# Patient Record
Sex: MALE | Race: WHITE | NOT HISPANIC OR LATINO | Employment: FULL TIME | URBAN - METROPOLITAN AREA
[De-identification: names, ages, dates, MRNs, and addresses within clinical notes are randomized per-mention and may not be internally consistent; named-entity substitution may affect disease eponyms.]

---

## 2017-01-09 DIAGNOSIS — I10 HYPERTENSION GOAL BP (BLOOD PRESSURE) < 140/90: Primary | ICD-10-CM

## 2017-01-09 RX ORDER — LOSARTAN POTASSIUM AND HYDROCHLOROTHIAZIDE 25; 100 MG/1; MG/1
1 TABLET ORAL DAILY
Qty: 90 TABLET | Refills: 0 | Status: SHIPPED
Start: 2017-01-09 | End: 2017-05-02

## 2017-01-09 NOTE — TELEPHONE ENCOUNTER
Patient was due in December for BP OV.    No appointment pending at this time.  Routing to provider to advise.    Kady Zarate RN

## 2017-07-26 ENCOUNTER — TELEPHONE (OUTPATIENT)
Dept: FAMILY MEDICINE | Facility: CLINIC | Age: 37
End: 2017-07-26

## 2017-07-26 NOTE — TELEPHONE ENCOUNTER
Patient is calling would like to be fit into providers schedule for physical. Please call to schedule.Thank you.

## 2017-08-02 ENCOUNTER — DOCUMENTATION ONLY (OUTPATIENT)
Dept: LAB | Facility: CLINIC | Age: 37
End: 2017-08-02

## 2017-08-02 DIAGNOSIS — Z00.00 ROUTINE GENERAL MEDICAL EXAMINATION AT A HEALTH CARE FACILITY: ICD-10-CM

## 2017-08-02 DIAGNOSIS — E78.1 HIGH TRIGLYCERIDES: Primary | ICD-10-CM

## 2017-08-02 DIAGNOSIS — Z13.6 CARDIOVASCULAR SCREENING; LDL GOAL LESS THAN 130: ICD-10-CM

## 2017-08-02 DIAGNOSIS — I10 ESSENTIAL HYPERTENSION WITH GOAL BLOOD PRESSURE LESS THAN 140/90: ICD-10-CM

## 2017-08-02 NOTE — PROGRESS NOTES
This patient has a lab only appointment on 8/8/2017 but does not have future orders. Please review, associate diagnosis and sign pending lab orders for the upcoming appointment.  He has an appointment with Dr. Cuellar on 8/15/2017.    Thank you,    [facility]

## 2017-09-12 DIAGNOSIS — I10 HYPERTENSION GOAL BP (BLOOD PRESSURE) < 140/90: ICD-10-CM

## 2017-09-12 NOTE — LETTER
September 14, 2017    Chato Kirkland  [address] [facility] [address]    Dear Chato,       We recently received a refill request for losartan-hydrochlorothiazide (HYZAAR) 100-25 MG per tablet.  We have refilled this for a one time 90 day supply only because you are due for a:    Blood pressure office visit with provider and fasting lab appointment      Please schedule this lab appointment 4-5 days prior to the office visit.     Please call at your earliest convenience so that there will not be a delay with your future refills.          Thank you,   Your [facility] Team/  [phone]

## 2017-09-13 RX ORDER — LOSARTAN POTASSIUM AND HYDROCHLOROTHIAZIDE 25; 100 MG/1; MG/1
1 TABLET ORAL DAILY
Qty: 90 TABLET | Refills: 0 | Status: SHIPPED | OUTPATIENT
Start: 2017-09-13 | End: 2017-12-25

## 2017-09-13 NOTE — TELEPHONE ENCOUNTER
Refill sent for losartan/hctz. Patient needs lab and provider appointment for htn. Please call to schedule appointment    Arin PRETTY, RN, CPN

## 2017-10-30 DIAGNOSIS — I10 HYPERTENSION GOAL BP (BLOOD PRESSURE) < 140/90: ICD-10-CM

## 2017-10-31 RX ORDER — LOSARTAN POTASSIUM AND HYDROCHLOROTHIAZIDE 25; 100 MG/1; MG/1
TABLET ORAL
Qty: 90 TABLET | OUTPATIENT
Start: 2017-10-31

## 2017-10-31 NOTE — TELEPHONE ENCOUNTER
Patient due to office visit for more refills. Last refill sent 9/13/17 and dispensed by mail order pharmacy 10/30/17. Letter was sent to patient 9/14/17. Lizeth Marcos RN

## 2018-01-29 ENCOUNTER — RADIANT APPOINTMENT (OUTPATIENT)
Dept: GENERAL RADIOLOGY | Facility: CLINIC | Age: 38
End: 2018-01-29
Attending: NURSE PRACTITIONER
Payer: COMMERCIAL

## 2018-01-29 ENCOUNTER — OFFICE VISIT (OUTPATIENT)
Dept: FAMILY MEDICINE | Facility: CLINIC | Age: 38
End: 2018-01-29
Payer: COMMERCIAL

## 2018-01-29 VITALS
DIASTOLIC BLOOD PRESSURE: 95 MMHG | OXYGEN SATURATION: 98 % | BODY MASS INDEX: 33.38 KG/M2 | TEMPERATURE: 99.8 F | RESPIRATION RATE: 16 BRPM | SYSTOLIC BLOOD PRESSURE: 151 MMHG | WEIGHT: 253 LBS | HEART RATE: 90 BPM

## 2018-01-29 DIAGNOSIS — R05.9 COUGH: ICD-10-CM

## 2018-01-29 DIAGNOSIS — J40 BRONCHITIS: Primary | ICD-10-CM

## 2018-01-29 PROCEDURE — 99213 OFFICE O/P EST LOW 20 MIN: CPT | Performed by: NURSE PRACTITIONER

## 2018-01-29 PROCEDURE — 71046 X-RAY EXAM CHEST 2 VIEWS: CPT | Mod: FY

## 2018-01-29 RX ORDER — CODEINE PHOSPHATE AND GUAIFENESIN 10; 100 MG/5ML; MG/5ML
1 SOLUTION ORAL EVERY 4 HOURS PRN
Qty: 120 ML | Refills: 0 | Status: SHIPPED | OUTPATIENT
Start: 2018-01-29 | End: 2018-03-01

## 2018-01-29 RX ORDER — ALBUTEROL SULFATE 90 UG/1
2 AEROSOL, METERED RESPIRATORY (INHALATION) EVERY 6 HOURS PRN
Qty: 1 INHALER | Refills: 0 | Status: SHIPPED | OUTPATIENT
Start: 2018-01-29 | End: 2018-03-01

## 2018-01-29 NOTE — MR AVS SNAPSHOT
After Visit Summary   1/29/2018    Chato Kirkland    MRN: [id number]           Patient Information     Date Of Birth          1980        Visit Information        Provider Department      1/29/2018 11:20 AM Yesenia Fajardo APRN CNP [facility]        Today's Diagnoses     Bronchitis    -  1       Follow-ups after your visit        Who to contact     If you have questions or need follow up information about today's clinic visit or your schedule please contact [facility] directly at [phone].  Normal or non-critical lab and imaging results will be communicated to you by [employer], letter or phone within 4 business days after the clinic has received the results. If you do not hear from us within 7 days, please contact the clinic through [employer] or phone. If you have a critical or abnormal lab result, we will notify you by phone as soon as possible.  Submit refill requests through [employer] or call your pharmacy and they will forward the refill request to us. Please allow 3 business days for your refill to be completed.          Additional Information About Your Visit        MyChart Information     [employer] gives you secure access to your electronic health record. If you see a primary care provider, you can also send messages to your care team and make appointments. If you have questions, please call your primary care clinic.  If you do not have a primary care provider, please call [phone] and they will assist you.        Care EveryWhere ID     This is your Care EveryWhere ID. This could be used by other organizations to access your [facility] medical records  [id number]        Your Vitals Were     Pulse Temperature Respirations Pulse Oximetry BMI (Body Mass Index)       90 99.8  F (37.7  C) (Oral) 16 98% 33.38 kg/m2        Blood Pressure from Last 3 Encounters:   01/29/18 (!) 151/95   06/16/16 132/84   01/04/16 132/89    Weight from Last 3 Encounters:   01/29/18 253 lb  (114.8 kg)   06/16/16 243 lb (110.2 kg)   01/04/16 248 lb (112.5 kg)                 Today's Medication Changes          These changes are accurate as of 1/29/18 12:50 PM.  If you have any questions, ask your nurse or doctor.               Start taking these medicines.        Dose/Directions    albuterol 108 (90 BASE) MCG/ACT Inhaler   Commonly known as:  PROAIR HFA/PROVENTIL HFA/VENTOLIN HFA   Used for:  Bronchitis   Started by:  Yesenia Fajardo APRN CNP        Dose:  2 puff   Inhale 2 puffs into the lungs every 6 hours as needed for shortness of breath / dyspnea or wheezing   Quantity:  1 Inhaler   Refills:  0       guaiFENesin-codeine 100-10 MG/5ML Soln solution   Commonly known as:  ROBITUSSIN AC   Used for:  Bronchitis   Started by:  Yesenia Fajardo APRN CNP        Dose:  1 tsp.   Take 5 mLs by mouth every 4 hours as needed for cough   Quantity:  120 mL   Refills:  0            Where to get your medicines      These medications were sent to [facility] [id number] [place] [address] [address] [facility] [employer] [place]  [address] [facility] [address]     Phone:  [phone]     albuterol 108 (90 BASE) MCG/ACT Inhaler         Some of these will need a paper prescription and others can be bought over the counter.  Ask your nurse if you have questions.     Bring a paper prescription for each of these medications     guaiFENesin-codeine 100-10 MG/5ML Soln solution                Primary Care Provider Office Phone # Fax #    Yon Cuellar [phone] [address] [facility] [address]        Equal Access to Services     KEZIA KHALIL AH: Thee Narayan, gabo lumaira, liane kaalmada chava, jess maldonado. So [facility] [phone].    ATENCIÓN: Si habla español, tiene a monique disposición servicios gratuitos de asistencia lingüística. Llame al [phone].    We comply with applicable federal civil rights  laws and [place] laws. We do not discriminate on the basis of race, color, national origin, age, disability, sex, sexual orientation, or gender identity.            Thank you!     Thank you for choosing [facility]  [facility]. Our goal is always to provide you with excellent care. Hearing back from our patients is one way we can continue to improve our services. Please take a few minutes to complete the written survey that you may receive in the mail after your visit with us. Thank you!             Your Updated Medication List - Protect others around you: Learn how to safely use, store and throw away your medicines at www.disposemymeds.org.          This list is accurate as of 1/29/18 12:50 PM.  Always use your most recent med list.                   Brand Name Dispense Instructions for use Diagnosis    albuterol 108 (90 BASE) MCG/ACT Inhaler    PROAIR HFA/PROVENTIL HFA/VENTOLIN HFA    1 Inhaler    Inhale 2 puffs into the lungs every 6 hours as needed for shortness of breath / dyspnea or wheezing    Bronchitis       fish oil-omega-3 fatty acids 1000 MG capsule      Take 1 capsule by mouth daily.        guaiFENesin-codeine 100-10 MG/5ML Soln solution    ROBITUSSIN AC    120 mL    Take 5 mLs by mouth every 4 hours as needed for cough    Bronchitis       losartan-hydrochlorothiazide 100-25 MG per tablet    HYZAAR    30 tablet    Last refill without md appointment/labs    Hypertension goal BP (blood pressure) < 140/90       MULTI VITAMIN MENS PO      Take  by mouth daily.

## 2018-01-29 NOTE — PROGRESS NOTES
SUBJECTIVE:   Chato Kirkland is a 37 year old male who presents to clinic today for the following health issues:      RESPIRATORY SYMPTOMS      Duration: 6 days    Description  nasal congestion, sore throat, cough, wheezing, fever, chills, fatigue/malaise and hoarse voice    Severity: moderate    Accompanying signs and symptoms: None    History (predisposing factors):  none    Precipitating or alleviating factors: None    Therapies tried and outcome:  none    Problem list and histories reviewed & adjusted, as indicated.  Additional history: as documented    Patient Active Problem List   Diagnosis     CARDIOVASCULAR SCREENING; LDL GOAL LESS THAN 130     High triglycerides     Seasonal allergic rhinitis     Essential hypertension with goal blood pressure less than 140/90     History reviewed. No pertinent surgical history.    Social History   Substance Use Topics     Smoking status: Never Smoker     Smokeless tobacco: Never Used     Alcohol use Yes      Comment: rare     Family History   Problem Relation Age of Onset     Hypertension Father            Reviewed and updated as needed this visit by clinical staff  Tobacco  Meds  Med Hx  Surg Hx  Fam Hx  Soc Hx      Reviewed and updated as needed this visit by Provider         ROS:  Constitutional, HEENT, cardiovascular, pulmonary, GI, , musculoskeletal, neuro, skin, endocrine and psych systems are negative, except as otherwise noted.    OBJECTIVE:     BP (!) 151/95  Pulse 90  Temp 99.8  F (37.7  C) (Oral)  Resp 16  Wt 253 lb (114.8 kg)  SpO2 98%  BMI 33.38 kg/m2  Body mass index is 33.38 kg/(m^2).  GENERAL:  alert and no distress  EYES: Eyes grossly normal to inspection, PERRL and conjunctivae and sclerae normal  HENT: ear canals and TM's normal, nose and mouth without ulcers or lesions  NECK: no adenopathy, no asymmetry, masses, or scars and thyroid normal to palpation  RESP: expiratory wheezes throughout  CV: regular rate and rhythm, normal S1 S2, no S3 or  S4, no murmur, click or rub, no peripheral edema and peripheral pulses strong  SKIN: no suspicious lesions or rashes    Diagnostic Test Results:  CXR - unremarkable    ASSESSMENT/PLAN:     1. Bronchitis    - albuterol (PROAIR HFA/PROVENTIL HFA/VENTOLIN HFA) 108 (90 BASE) MCG/ACT Inhaler; Inhale 2 puffs into the lungs every 6 hours as needed for shortness of breath / dyspnea or wheezing  Dispense: 1 Inhaler; Refill: 0  - guaiFENesin-codeine (ROBITUSSIN AC) 100-10 MG/5ML SOLN solution; Take 5 mLs by mouth every 4 hours as needed for cough  Dispense: 120 mL; Refill: 0    See Patient Instructions    GINNY [facility]

## 2018-02-24 DIAGNOSIS — Z13.6 CARDIOVASCULAR SCREENING; LDL GOAL LESS THAN 130: ICD-10-CM

## 2018-02-24 DIAGNOSIS — I10 ESSENTIAL HYPERTENSION WITH GOAL BLOOD PRESSURE LESS THAN 140/90: ICD-10-CM

## 2018-02-24 DIAGNOSIS — E78.1 HIGH TRIGLYCERIDES: ICD-10-CM

## 2018-02-24 DIAGNOSIS — Z00.00 ROUTINE GENERAL MEDICAL EXAMINATION AT A HEALTH CARE FACILITY: ICD-10-CM

## 2018-02-24 LAB
ERYTHROCYTE [DISTWIDTH] IN BLOOD BY AUTOMATED COUNT: 11.9 % (ref 10–15)
HCT VFR BLD AUTO: 40.8 % (ref 40–53)
HGB BLD-MCNC: 14.1 G/DL (ref 13.3–17.7)
MCH RBC QN AUTO: 30 PG (ref 26.5–33)
MCHC RBC AUTO-ENTMCNC: 34.6 G/DL (ref 31.5–36.5)
MCV RBC AUTO: 87 FL (ref 78–100)
PLATELET # BLD AUTO: 281 10E9/L (ref 150–450)
RBC # BLD AUTO: 4.7 10E12/L (ref 4.4–5.9)
WBC # BLD AUTO: 6.3 10E9/L (ref 4–11)

## 2018-02-24 PROCEDURE — 85027 COMPLETE CBC AUTOMATED: CPT | Performed by: FAMILY MEDICINE

## 2018-02-24 PROCEDURE — 80061 LIPID PANEL: CPT | Performed by: FAMILY MEDICINE

## 2018-02-24 PROCEDURE — 80053 COMPREHEN METABOLIC PANEL: CPT | Performed by: FAMILY MEDICINE

## 2018-02-24 PROCEDURE — 36415 COLL VENOUS BLD VENIPUNCTURE: CPT | Performed by: FAMILY MEDICINE

## 2018-02-26 LAB
ALBUMIN SERPL-MCNC: 4.1 G/DL (ref 3.4–5)
ALP SERPL-CCNC: 56 U/L (ref 40–150)
ALT SERPL W P-5'-P-CCNC: 30 U/L (ref 0–70)
ANION GAP SERPL CALCULATED.3IONS-SCNC: 8 MMOL/L (ref 3–14)
AST SERPL W P-5'-P-CCNC: 19 U/L (ref 0–45)
BILIRUB SERPL-MCNC: 0.6 MG/DL (ref 0.2–1.3)
BUN SERPL-MCNC: 13 MG/DL (ref 7–30)
CALCIUM SERPL-MCNC: 9.2 MG/DL (ref 8.5–10.1)
CHLORIDE SERPL-SCNC: 101 MMOL/L (ref 94–109)
CHOLEST SERPL-MCNC: 212 MG/DL
CO2 SERPL-SCNC: 30 MMOL/L (ref 20–32)
CREAT SERPL-MCNC: 0.86 MG/DL (ref 0.66–1.25)
GFR SERPL CREATININE-BSD FRML MDRD: >90 ML/MIN/1.7M2
GLUCOSE SERPL-MCNC: 91 MG/DL (ref 70–99)
HDLC SERPL-MCNC: 41 MG/DL
LDLC SERPL CALC-MCNC: 122 MG/DL
NONHDLC SERPL-MCNC: 171 MG/DL
POTASSIUM SERPL-SCNC: 3.9 MMOL/L (ref 3.4–5.3)
PROT SERPL-MCNC: 7.8 G/DL (ref 6.8–8.8)
SODIUM SERPL-SCNC: 139 MMOL/L (ref 133–144)
TRIGL SERPL-MCNC: 245 MG/DL

## 2018-03-01 ENCOUNTER — OFFICE VISIT (OUTPATIENT)
Dept: FAMILY MEDICINE | Facility: CLINIC | Age: 38
End: 2018-03-01
Payer: COMMERCIAL

## 2018-03-01 VITALS
RESPIRATION RATE: 16 BRPM | WEIGHT: 248 LBS | DIASTOLIC BLOOD PRESSURE: 92 MMHG | TEMPERATURE: 97.2 F | HEART RATE: 65 BPM | BODY MASS INDEX: 32.87 KG/M2 | HEIGHT: 73 IN | OXYGEN SATURATION: 98 % | SYSTOLIC BLOOD PRESSURE: 147 MMHG

## 2018-03-01 DIAGNOSIS — E78.1 HIGH TRIGLYCERIDES: ICD-10-CM

## 2018-03-01 DIAGNOSIS — I10 HYPERTENSION GOAL BP (BLOOD PRESSURE) < 140/90: ICD-10-CM

## 2018-03-01 DIAGNOSIS — Z00.00 ROUTINE GENERAL MEDICAL EXAMINATION AT A HEALTH CARE FACILITY: Primary | ICD-10-CM

## 2018-03-01 PROCEDURE — 99395 PREV VISIT EST AGE 18-39: CPT | Performed by: FAMILY MEDICINE

## 2018-03-01 RX ORDER — ALBUTEROL SULFATE 90 UG/1
2 AEROSOL, METERED RESPIRATORY (INHALATION) EVERY 6 HOURS PRN
COMMUNITY
End: 2019-03-26

## 2018-03-01 RX ORDER — LOSARTAN POTASSIUM AND HYDROCHLOROTHIAZIDE 25; 100 MG/1; MG/1
TABLET ORAL
Qty: 90 TABLET | Refills: 3 | Status: SHIPPED | OUTPATIENT
Start: 2018-03-01 | End: 2018-03-02

## 2018-03-01 RX ORDER — AMLODIPINE BESYLATE 5 MG/1
5 TABLET ORAL DAILY
Qty: 90 TABLET | Refills: 1 | Status: SHIPPED | OUTPATIENT
Start: 2018-03-01 | End: 2018-07-17

## 2018-03-01 ASSESSMENT — PAIN SCALES - GENERAL: PAINLEVEL: NO PAIN (0)

## 2018-03-01 NOTE — PROGRESS NOTES
SUBJECTIVE:   CC: Chato Kirkland is an 37 year old male who presents for preventative health visit.   Taking hyzaar daily. No outside blood pressure cuff but checks at stores and dentist office. Higher in AM and levels out in pm.   No chest pain or shortness of breath.  Exercise regularly. Watching diet now.   Weight loss. Eye exam 1.5 years ago - ok.   No family history early cancers. Skin cancers in family history. No prostate/colon cancers.   Drinks some milk. Not vitaminD. No history ulcers. ALCOHOL - cut down. No regularly nsaids/asa.   Physical   Annual:     Getting at least 3 servings of Calcium per day::  Yes    Bi-annual eye exam::  Yes    Dental care twice a year::  Yes    Sleep apnea or symptoms of sleep apnea::  None    Diet::  Low salt and Low fat/cholesterol    Frequency of exercise::  2-3 days/week    Duration of exercise::  Greater than 60 minutes    Taking medications regularly::  Yes    Medication side effects::  None    Additional concerns today::  No          diet change he is loosing weight / low on carbs and salt -2 cans a pop a week/ low sugar   His blood pressure was high at the dentist/ he needs a letter for the dentist     PROBLEMS TO ADD ON...    Today's PHQ-2 Score:   PHQ-2 ( 1999 [employer]) 3/1/2018   Q1: Little interest or pleasure in doing things 0   Q2: Feeling down, depressed or hopeless 0   PHQ-2 Score 0   Q1: Little interest or pleasure in doing things Not at all   Q2: Feeling down, depressed or hopeless Not at all   PHQ-2 Score 0       Abuse: Current or Past(Physical, Sexual or Emotional)- No  Do you feel safe in your environment - Yes    Social History   Substance Use Topics     Smoking status: Never Smoker     Smokeless tobacco: Never Used     Alcohol use Yes      Comment: rare         Last PSA: No results found for: PSA    Reviewed orders with patient. Reviewed health maintenance and updated orders accordingly - Yes  Labs reviewed in EPIC    Reviewed and updated as needed this visit  "by clinical staff         Reviewed and updated as needed this visit by Provider            Review of Systems  C: NEGATIVE for fever, chills, change in weight  I: NEGATIVE for worrisome rashes, moles or lesions  E: NEGATIVE for vision changes or irritation  ENT: NEGATIVE for ear, mouth and throat problems. Occasionally snoring.  - no marshall  R: NEGATIVE for significant cough or SOB  CV: NEGATIVE for chest pain, palpitations or peripheral edema  GI: NEGATIVE for nausea, abdominal pain, heartburn, or change in bowel habits. No black or bloody stools.    male: negative for dysuria, hematuria, decreased urinary stream, erectile dysfunction, urethral discharge. No hematuria. No std concerns.   M: NEGATIVE for significant arthralgias or myalgia  N: NEGATIVE for weakness, dizziness or paresthesias  E: NEGATIVE for temperature intolerance, skin/hair changes  H: NEGATIVE for bleeding problems.  P: NEGATIVE for changes in mood or affect    OBJECTIVE:   There were no vitals taken for this visit.   BP (!) 147/92  Pulse 65  Temp 97.2  F (36.2  C) (Oral)  Resp 16  Ht 6' 0.5\" (1.842 m)  Wt 248 lb (112.5 kg)  SpO2 98%  BMI 33.17 kg/m2      Physical Exam  GENERAL: healthy, alert and no distress  EYES: Eyes grossly normal to inspection, PERRL and conjunctivae and sclerae normal  HENT: ear canals and TM's normal, nose and mouth without ulcers or lesions  NECK: no adenopathy, no asymmetry, masses, or scars and thyroid normal to palpation  RESP: lungs clear to auscultation - no rales, rhonchi or wheezes  BREAST: normal without masses, tenderness or nipple discharge and no palpable axillary masses or adenopathy  CV: regular rate and rhythm, normal S1 S2, no S3 or S4, no murmur, click or rub, no peripheral edema and peripheral pulses strong  ABDOMEN: soft, nontender, no hepatosplenomegaly, no masses and bowel sounds normal   (male): patient deferred  exam - no concerns about pain/masses/hernia  MS: no gross musculoskeletal " "defects noted, no edema  SKIN: no suspicious lesions or rashes  NEURO: Normal strength and tone, mentation intact and speech normal  BACK: no CVA tenderness, no paralumbar tenderness  PSYCH: mentation appears normal, affect normal/bright    ASSESSMENT/PLAN:   ASSESSMENT / PLAN:  (Z00.00) Routine general medical examination at a health care facility  (primary encounter diagnosis)  Comment: generally healthy and normal exam/labs  Plan:Reviewed self mole/testicle check handout.  VitaminD.     (I10) Hypertension goal BP (blood pressure) < 140/90  Comment: needs help  Plan: losartan-hydrochlorothiazide (HYZAAR) 100-25 MG        per tablet, amLODIPine (NORVASC) 5 MG tablet        Add norvasc. Reveiwed risks and side effects of medication  Continue diet/exercise and weight loss. Chest pain or shortness of breath to er. Recheck in 6 months  Sooner if worse. Note for dentist - ok - no symptoms.     (E78.1) High triglycerides  Comment: encouraged by weight loss  Plan: continue diet/exercise. Recheck one year.         COUNSELING:   Reviewed preventive health counseling, as reflected in patient instructions       Regular exercise       Healthy diet/nutrition       Vision screening       Osteoporosis Prevention/Bone Health         reports that he has never smoked. He has never used smokeless tobacco.    Estimated body mass index is 33.38 kg/(m^2) as calculated from the following:    Height as of 6/16/16: 6' 1\" (1.854 m).    Weight as of 1/29/18: 253 lb (114.8 kg).       Counseling Resources:  ATP IV Guidelines  Pooled Cohorts Equation Calculator  FRAX Risk Assessment  ICSI Preventive Guidelines  Dietary Guidelines for Americans, 2010  USDA's MyPlate  ASA Prophylaxis  Lung CA Screening    Yon Cuellar MD  [facility] [place]  Answers for HPI/ROS submitted by the patient on 3/1/2018   PHQ-2 Score: 0    "

## 2018-03-01 NOTE — LETTER
[facility]  [address] [facility] [address]  Phone: [phone]    March 1, 2018        Chato Kirkland  [address] [facility] [address]          To whom it may concern:    RE: Chato Kirkland    Patient was seen and treated today at our clinic today. Patient with history of high blood pressure and improving with new medication. No symptoms. Ok for dental appointment.     Please contact me for questions or concerns.      Sincerely,        Yon Cuellar MD

## 2018-03-01 NOTE — MR AVS SNAPSHOT
"              After Visit Summary   3/1/2018    Chato Kirkland    MRN: [id number]           Patient Information     Date Of Birth          1980        Visit Information        Provider Department      3/1/2018 9:20 AM Yon Cuellar MD [facility]        Today's Diagnoses     Routine general medical examination at a health care facility    -  1    Hypertension goal BP (blood pressure) < 140/90        High triglycerides           Follow-ups after your visit        Who to contact     If you have questions or need follow up information about today's clinic visit or your schedule please contact [employer] [employer] directly at [phone].  Normal or non-critical lab and imaging results will be communicated to you by MyChart, letter or phone within 4 business days after the clinic has received the results. If you do not hear from us within 7 days, please contact the clinic through [employer] or phone. If you have a critical or abnormal lab result, we will notify you by phone as soon as possible.  Submit refill requests through [employer] or call your pharmacy and they will forward the refill request to us. Please allow 3 business days for your refill to be completed.          Additional Information About Your Visit        MyChart Information     [employer] gives you secure access to your electronic health record. If you see a primary care provider, you can also send messages to your care team and make appointments. If you have questions, please call your primary care clinic.  If you do not have a primary care provider, please call [phone] and they will assist you.        Care EveryWhere ID     This is your Care EveryWhere ID. This could be used by other organizations to access your [facility] medical records  [id number]        Your Vitals Were     Pulse Temperature Respirations Height Pulse Oximetry BMI (Body Mass Index)    65 97.2  F (36.2  C) (Oral) 16 6' 0.5\" (1.842 m) 98% 33.17 kg/m2       Blood " Pressure from Last 3 Encounters:   03/01/18 (!) 147/92   01/29/18 (!) 151/95   06/16/16 132/84    Weight from Last 3 Encounters:   03/01/18 248 lb (112.5 kg)   01/29/18 253 lb (114.8 kg)   06/16/16 243 lb (110.2 kg)              Today, you had the following     No orders found for display         Today's Medication Changes          These changes are accurate as of 3/1/18  9:49 AM.  If you have any questions, ask your nurse or doctor.               Start taking these medicines.        Dose/Directions    amLODIPine 5 MG tablet   Commonly known as:  NORVASC   Used for:  Hypertension goal BP (blood pressure) < 140/90   Started by:  Yon Cuellar MD        Dose:  5 mg   Take 1 tablet (5 mg) by mouth daily New for blood pressure take in PM   Quantity:  90 tablet   Refills:  1         These medicines have changed or have updated prescriptions.        Dose/Directions    losartan-hydrochlorothiazide 100-25 MG per tablet   Commonly known as:  HYZAAR   This may have changed:  additional instructions   Used for:  Hypertension goal BP (blood pressure) < 140/90   Changed by:  Yon Cuellar MD        For blood pressure in AM   Quantity:  90 tablet   Refills:  3            Where to get your medicines      These medications were sent to [facility] - [address], [facility] [address]     Phone:  [phone]     amLODIPine 5 MG tablet    losartan-hydrochlorothiazide 100-25 MG per tablet                Primary Care Provider Office Phone # Fax #    Yon Cuellar [phone] [address] [facility] [address]        Equal Access to Services     [facility] : Hadii fletcher ku hadasho Soomaali, waaxda luqadaha, qaybta kaalmada adeegyajuliette, jess maldonado. So [facility] [phone].    ATENCIÓN: Si habla español, tiene a monique disposición servicios gratuitos de asistencia lingüística. Llame al [phone].    We  comply with applicable federal civil rights laws and [place] laws. We do not discriminate on the basis of race, color, national origin, age, disability, sex, sexual orientation, or gender identity.            Thank you!     Thank you for choosing [facility]  for your care. Our goal is always to provide you with excellent care. Hearing back from our patients is one way we can continue to improve our services. Please take a few minutes to complete the written survey that you may receive in the mail after your visit with us. Thank you!             Your Updated Medication List - Protect others around you: Learn how to safely use, store and throw away your medicines at www.disposemymeds.org.          This list is accurate as of 3/1/18  9:49 AM.  Always use your most recent med list.                   Brand Name Dispense Instructions for use Diagnosis    albuterol 108 (90 BASE) MCG/ACT Inhaler    PROAIR HFA/PROVENTIL HFA/VENTOLIN HFA     Inhale 2 puffs into the lungs every 6 hours as needed for shortness of breath / dyspnea or wheezing        amLODIPine 5 MG tablet    NORVASC    90 tablet    Take 1 tablet (5 mg) by mouth daily New for blood pressure take in PM    Hypertension goal BP (blood pressure) < 140/90       fish oil-omega-3 fatty acids 1000 MG capsule      Take 1 capsule by mouth daily.        losartan-hydrochlorothiazide 100-25 MG per tablet    HYZAAR    90 tablet    For blood pressure in AM    Hypertension goal BP (blood pressure) < 140/90       MULTI VITAMIN MENS PO      Take  by mouth daily.

## 2018-03-01 NOTE — NURSING NOTE
"Chief Complaint   Patient presents with     Physical       Initial BP (!) 139/110  Pulse 65  Temp 97.2  F (36.2  C) (Oral)  Resp 16  Ht 6' 0.5\" (1.842 m)  Wt 248 lb (112.5 kg)  SpO2 98%  BMI 33.17 kg/m2 Estimated body mass index is 33.17 kg/(m^2) as calculated from the following:    Height as of this encounter: 6' 0.5\" (1.842 m).    Weight as of this encounter: 248 lb (112.5 kg).  Medication Reconciliation: complete   Sonia Clark CMA    "

## 2018-03-02 ENCOUNTER — TELEPHONE (OUTPATIENT)
Dept: FAMILY MEDICINE | Facility: CLINIC | Age: 38
End: 2018-03-02

## 2018-03-02 DIAGNOSIS — I10 HYPERTENSION GOAL BP (BLOOD PRESSURE) < 140/90: ICD-10-CM

## 2018-03-02 RX ORDER — LOSARTAN POTASSIUM AND HYDROCHLOROTHIAZIDE 25; 100 MG/1; MG/1
1 TABLET ORAL EVERY MORNING
Qty: 90 TABLET | Refills: 3 | Status: SHIPPED | OUTPATIENT
Start: 2018-03-02 | End: 2019-03-26

## 2018-03-05 ENCOUNTER — TELEPHONE (OUTPATIENT)
Dept: FAMILY MEDICINE | Facility: CLINIC | Age: 38
End: 2018-03-05

## 2018-03-05 NOTE — TELEPHONE ENCOUNTER
Please clarify directions for lorsartan/HCTZ 100-25 tabs  [facility]   Fax [phone]  Call [phone]

## 2018-03-26 ENCOUNTER — TELEPHONE (OUTPATIENT)
Dept: FAMILY MEDICINE | Facility: CLINIC | Age: 38
End: 2018-03-26

## 2018-04-09 ENCOUNTER — TELEPHONE (OUTPATIENT)
Dept: FAMILY MEDICINE | Facility: CLINIC | Age: 38
End: 2018-04-09

## 2018-04-09 NOTE — TELEPHONE ENCOUNTER
Patient calling would like a call back from care team re recent physical and labs that were ordered. Please call to discuss.

## 2018-06-28 ENCOUNTER — TELEPHONE (OUTPATIENT)
Dept: FAMILY MEDICINE | Facility: CLINIC | Age: 38
End: 2018-06-28

## 2018-06-28 NOTE — TELEPHONE ENCOUNTER
Panel Management Review      Patient has the following on his problem list:     Hypertension   Last three blood pressure readings:  BP Readings from Last 3 Encounters:   03/01/18 (!) 147/92   01/29/18 (!) 151/95   06/16/16 132/84     Blood pressure: FAILED    HTN Guidelines:  Age 18-59 BP range:  Less than 140/90  Age 60-85 with Diabetes:  Less than 140/90  Age 60-85 without Diabetes:  less than 150/90      Composite cancer screening  Chart review shows that this patient is due/due soon for the following None  Summary:    Patient is due/failing the following:   BP CHECK    Action needed:   None follow up in 6 months / September 2018    Type of outreach:    0    Questions for provider review:    None                                                                                                                                    Sonia Calrk CMA     Chart routed to 0 .

## 2018-07-17 DIAGNOSIS — I10 HYPERTENSION GOAL BP (BLOOD PRESSURE) < 140/90: ICD-10-CM

## 2018-07-17 RX ORDER — AMLODIPINE BESYLATE 5 MG/1
TABLET ORAL
Qty: 90 TABLET | Refills: 0 | Status: SHIPPED | OUTPATIENT
Start: 2018-07-17 | End: 2019-03-26

## 2018-07-17 NOTE — TELEPHONE ENCOUNTER
Per OV note 3/1/18 Recheck in 6 months makes him due 9/1/18.  # 90 amLODIPine (NORVASC) 5 MG tablet

## 2018-10-25 ENCOUNTER — TELEPHONE (OUTPATIENT)
Dept: FAMILY MEDICINE | Facility: CLINIC | Age: 38
End: 2018-10-25

## 2018-10-25 NOTE — TELEPHONE ENCOUNTER
Panel Management Review      Patient has the following on his problem list:     Hypertension   Last three blood pressure readings:  BP Readings from Last 3 Encounters:   03/01/18 (!) 147/92   01/29/18 (!) 151/95   06/16/16 132/84     Blood pressure: FAILED    HTN Guidelines:  Age 18-59 BP range:  Less than 140/90  Age 60-85 with Diabetes:  Less than 140/90  Age 60-85 without Diabetes:  less than 150/90      Composite cancer screening  Chart review shows that this patient is due/due soon for the following None  Summary:    Patient is due/failing the following:   BP CHECK    Action needed:   Patient needs office visit for Blood pressure check .    Type of outreach:    Sent W4 message.    Questions for provider review:    None                                                                                                                                    Sonia Clark CMA     Chart routed to 0 .

## 2019-01-28 DIAGNOSIS — I10 HYPERTENSION GOAL BP (BLOOD PRESSURE) < 140/90: ICD-10-CM

## 2019-01-28 RX ORDER — LOSARTAN POTASSIUM AND HYDROCHLOROTHIAZIDE 25; 100 MG/1; MG/1
TABLET ORAL
Qty: 90 TABLET | Refills: 3 | OUTPATIENT
Start: 2019-01-28

## 2019-02-27 ENCOUNTER — TELEPHONE (OUTPATIENT)
Dept: FAMILY MEDICINE | Facility: CLINIC | Age: 39
End: 2019-02-27

## 2019-02-27 DIAGNOSIS — I10 ESSENTIAL HYPERTENSION WITH GOAL BLOOD PRESSURE LESS THAN 140/90: Primary | ICD-10-CM

## 2019-02-27 NOTE — TELEPHONE ENCOUNTER
His medication requires a renal panel done at least once yearly per clinic policy. I don't think it's too expensive and I won't do cholesterol/blood count this year. Yon Cuellar MD

## 2019-02-27 NOTE — TELEPHONE ENCOUNTER
I called Chato and set up a Blood Pressure Check Appt. On 03-. He has a concern about getting his labs draw because last time they were not covered by his Insurance. Does he need to get labs done? It was the way it was coded he thinks. He would rather not get labs done unless we figure it out somehow.    Sonia Clark, [facility]  I talk to Annette Vargas and she is going to help with finding out about insurance coverage for labs.

## 2019-02-28 ENCOUNTER — TELEPHONE (OUTPATIENT)
Dept: FAMILY MEDICINE | Facility: CLINIC | Age: 39
End: 2019-02-28

## 2019-02-28 ENCOUNTER — PATIENT OUTREACH (OUTPATIENT)
Dept: CARE COORDINATION | Facility: CLINIC | Age: 39
End: 2019-02-28

## 2019-02-28 DIAGNOSIS — E78.1 HIGH TRIGLYCERIDES: Primary | ICD-10-CM

## 2019-02-28 NOTE — PROGRESS NOTES
"Clinic Care Coordination Contact 2/28/19  OUTREACH    Referral Information:            Chief Complaint   Patient presents with     Clinic Care Coordination - Initial     SW        Universal Utilization: NA     Utilization    Last refreshed: 2/28/2019  9:17 AM:  Hospital Admissions 0           Last refreshed: 2/28/2019  9:17 AM:  ED Visits 0           Last refreshed: 2/28/2019  9:17 AM:  No Show Count (past year) 0              Current as of: 2/28/2019  9:17 AM              Clinical Concerns:  Current Medical Concerns:    Patient Active Problem List   Diagnosis     CARDIOVASCULAR SCREENING; LDL GOAL LESS THAN 130     High triglycerides     Seasonal allergic rhinitis     Essential hypertension with goal blood pressure less than 140/90       Current Behavioral Concerns: See above    Education Provided to patient: NA      Health Maintenance Reviewed:    Clinical Pathway: None    Medication Management:  NA     Functional Status:       Living Situation:       Diet/Exercise/Sleep:       Transportation:           Psychosocial:        Financial/Insurance:   Pt has insurance and was wondering why he received a bill from his preventative visit. Discussion with him about preventative visits as typically a \"hands off\" exam where no labs or BP are taken etc. Pt states, that is all he wants than at his visit.     I explained further that his PCP wants him to have lab work to monitor his renal function as he is on a medication that needs that performed annually. I provided him with the [facility] Estimate Line [phone] and also encouraged him to shop around to different labs for the best out of pocket price.     Pt liked the sounds of this and would like to know the name of the lab draw so he can check pricing. I will route a message to his PCP so he can answer that question for the pt.     I also encouraged the pt to call his insurance company to inquire what was covered in the preventative visit so he could better understand what " that apt can offer.        Resources and Interventions:  Current Resources:      Goals:         Patient/Caregiver understanding: yes       Future Appointments              In 3 weeks Yon Cuellar MD [facility]          Plan:   1.) SW to route message to pt's PCP about the name of the lab draw.  2.) Pt to call the [facility] Estimate line [phone] as well as other labs to inquire the price of the lab he needs to have.  3.) Pt to call his insurance company and inquire what they cover with the preventative visit.     SW will no longer follow at this time, but please re-refer should needs arise.     Annette Vargas, \[facility]\""  Care Coordinator Social Work    [facility] [place] and [place]  [phone]  2/28/2019 3:54 PM

## 2019-02-28 NOTE — PROGRESS NOTES
Pt advised of name of lab test and ICD 9 code attached to it.  Advised usually screenings and well checks are covered completely by insurance but when there is a dx like hypertension then that is covered differently.  This test is for the hypertension medication he is on the provider needs to monitor how it is affecting his electrolytes and kidney function.  Advised more test were done last year for monitoring due to not having been done in a few years, since they were normal they may not be needed for a few years, he can discuss with provider. Pt verbalized understanding.  Sonia Cardenas BSN, RN

## 2019-02-28 NOTE — TELEPHONE ENCOUNTER
Panel Management Review      Patient has the following on his problem list:     Hypertension   Last three blood pressure readings:  BP Readings from Last 3 Encounters:   03/01/18 (!) 147/92   01/29/18 (!) 151/95   06/16/16 132/84     Blood pressure: FAILED    HTN Guidelines:  Age 18-59 BP range:  Less than 140/90  Age 60-85 with Diabetes:  Less than 140/90  Age 60-85 without Diabetes:  less than 150/90      Composite cancer screening  Chart review shows that this patient is due/due soon for the following None  Summary:    Patient is due/failing the following:   BP CHECK    Action needed:   Scheduled an appt    Type of outreach:    Phone, spoke to patient.  called Chato and zayda appt    Questions for provider review:    None                                                                                                                                    Sonia Clark CMA     Chart routed to 0 .

## 2019-03-18 ENCOUNTER — DOCUMENTATION ONLY (OUTPATIENT)
Dept: LAB | Facility: CLINIC | Age: 39
End: 2019-03-18

## 2019-03-18 DIAGNOSIS — Z13.6 CARDIOVASCULAR SCREENING; LDL GOAL LESS THAN 130: Primary | ICD-10-CM

## 2019-03-18 DIAGNOSIS — E78.1 HIGH TRIGLYCERIDES: ICD-10-CM

## 2019-03-18 DIAGNOSIS — I10 ESSENTIAL HYPERTENSION WITH GOAL BLOOD PRESSURE LESS THAN 140/90: ICD-10-CM

## 2019-03-20 DIAGNOSIS — I10 ESSENTIAL HYPERTENSION WITH GOAL BLOOD PRESSURE LESS THAN 140/90: ICD-10-CM

## 2019-03-20 DIAGNOSIS — E78.1 HIGH TRIGLYCERIDES: ICD-10-CM

## 2019-03-20 DIAGNOSIS — Z13.6 CARDIOVASCULAR SCREENING; LDL GOAL LESS THAN 130: ICD-10-CM

## 2019-03-20 LAB
ALBUMIN SERPL-MCNC: 4.6 G/DL (ref 3.4–5)
ANION GAP SERPL CALCULATED.3IONS-SCNC: 11 MMOL/L (ref 3–14)
BUN SERPL-MCNC: 16 MG/DL (ref 7–30)
CALCIUM SERPL-MCNC: 10.4 MG/DL (ref 8.5–10.1)
CHLORIDE SERPL-SCNC: 99 MMOL/L (ref 94–109)
CO2 SERPL-SCNC: 26 MMOL/L (ref 20–32)
CREAT SERPL-MCNC: 0.94 MG/DL (ref 0.66–1.25)
GFR SERPL CREATININE-BSD FRML MDRD: >90 ML/MIN/{1.73_M2}
GLUCOSE SERPL-MCNC: 88 MG/DL (ref 70–99)
PHOSPHATE SERPL-MCNC: 3.8 MG/DL (ref 2.5–4.5)
POTASSIUM SERPL-SCNC: 3.7 MMOL/L (ref 3.4–5.3)
SODIUM SERPL-SCNC: 136 MMOL/L (ref 133–144)

## 2019-03-20 PROCEDURE — 80069 RENAL FUNCTION PANEL: CPT | Performed by: FAMILY MEDICINE

## 2019-03-20 PROCEDURE — 36415 COLL VENOUS BLD VENIPUNCTURE: CPT | Performed by: FAMILY MEDICINE

## 2019-03-26 ENCOUNTER — OFFICE VISIT (OUTPATIENT)
Dept: FAMILY MEDICINE | Facility: CLINIC | Age: 39
End: 2019-03-26
Payer: COMMERCIAL

## 2019-03-26 VITALS
HEART RATE: 71 BPM | HEIGHT: 73 IN | DIASTOLIC BLOOD PRESSURE: 88 MMHG | SYSTOLIC BLOOD PRESSURE: 131 MMHG | WEIGHT: 248 LBS | BODY MASS INDEX: 32.87 KG/M2 | RESPIRATION RATE: 16 BRPM | OXYGEN SATURATION: 97 % | TEMPERATURE: 98.1 F

## 2019-03-26 DIAGNOSIS — I10 HYPERTENSION GOAL BP (BLOOD PRESSURE) < 140/90: ICD-10-CM

## 2019-03-26 DIAGNOSIS — I10 ESSENTIAL HYPERTENSION WITH GOAL BLOOD PRESSURE LESS THAN 140/90: ICD-10-CM

## 2019-03-26 DIAGNOSIS — Z23 NEED FOR VACCINATION: Primary | ICD-10-CM

## 2019-03-26 PROCEDURE — 90715 TDAP VACCINE 7 YRS/> IM: CPT | Performed by: FAMILY MEDICINE

## 2019-03-26 PROCEDURE — 99213 OFFICE O/P EST LOW 20 MIN: CPT | Mod: 25 | Performed by: FAMILY MEDICINE

## 2019-03-26 PROCEDURE — 90471 IMMUNIZATION ADMIN: CPT | Performed by: FAMILY MEDICINE

## 2019-03-26 RX ORDER — LOSARTAN POTASSIUM AND HYDROCHLOROTHIAZIDE 25; 100 MG/1; MG/1
1 TABLET ORAL EVERY MORNING
Qty: 90 TABLET | Refills: 3 | Status: SHIPPED | OUTPATIENT
Start: 2019-03-26 | End: 2020-07-21

## 2019-03-26 RX ORDER — AMLODIPINE BESYLATE 5 MG/1
TABLET ORAL
Qty: 90 TABLET | Refills: 3 | Status: SHIPPED | OUTPATIENT
Start: 2019-03-26 | End: 2020-07-21

## 2019-03-26 ASSESSMENT — MIFFLIN-ST. JEOR: SCORE: 2090.86

## 2019-03-26 NOTE — NURSING NOTE
"Chief Complaint   Patient presents with     Hypertension       Initial BP (!) 132/100   Pulse 71   Temp 98.1  F (36.7  C) (Oral)   Resp 16   Ht 1.842 m (6' 0.5\")   Wt 112.5 kg (248 lb)   SpO2 97%   BMI 33.17 kg/m   Estimated body mass index is 33.17 kg/m  as calculated from the following:    Height as of this encounter: 1.842 m (6' 0.5\").    Weight as of this encounter: 112.5 kg (248 lb).    Sonia Clark CMA  Screening Questionnaire for Adult Immunization    Are you sick today?   No   Do you have allergies to medications, food, a vaccine component or latex?   No   Have you ever had a serious reaction after receiving a vaccination?   No   Do you have a long-term health problem with heart disease, lung disease, asthma, kidney disease, metabolic disease (e.g. diabetes), anemia, or other blood disorder?   No   Do you have cancer, leukemia, HIV/AIDS, or any other immune system problem?   No   In the past 3 months, have you taken medications that affect  your immune system, such as prednisone, other steroids, or anticancer drugs; drugs for the treatment of rheumatoid arthritis, Crohn s disease, or psoriasis; or have you had radiation treatments?   No   Have you had a seizure, or a brain or other nervous system problem?   No   During the past year, have you received a transfusion of blood or blood     products, or been given immune (gamma) globulin or antiviral drug?   No   For women: Are you pregnant or is there a chance you could become        pregnant during the next month?   No   Have you received any vaccinations in the past 4 weeks?   No     Immunization questionnaire answers were all negative.        Per orders of Dr. Cuellar, injection of tdap  given by Sonia Clark. Patient instructed to remain in clinic for 15 minutes afterwards, and to report any adverse reaction to me immediately.       Screening performed by Sonia Clark on 3/26/2019 at 1:36 PM.'  "

## 2019-03-26 NOTE — PROGRESS NOTES
"SUBJECTIVE:  Chato Kirkland, a 38 year old male scheduled an appointment to discuss the following issues:  Need for vaccination  Follow-up htn, high triglycerides and obesity. Outside blood pressure reading.   No chest pain or shortness of breath. Emotionally doing. Exercise - started more recently.   No urine changes or hematuria. No nausea, vomiting or diarrhea or black or bloody stools. Emotionally doing ok.   Home ok. No body aches/fatigue. Some milk. No supplements.   Medical, social, surgical, and family histories reviewed.    ROS:  All other ROS negative.     OBJECTIVE:  /88   Pulse 71   Temp 98.1  F (36.7  C) (Oral)   Resp 16   Ht 1.842 m (6' 0.5\")   Wt 112.5 kg (248 lb)   SpO2 97%   BMI 33.17 kg/m    EXAM:  GENERAL APPEARANCE: healthy, alert and no distress  EYES: EOMI,  PERRL  HENT: ear canals and TM's normal and nose and mouth without ulcers or lesions  NECK: no adenopathy, no asymmetry, masses, or scars and thyroid normal to palpation  RESP: lungs clear to auscultation - no rales, rhonchi or wheezes  CV: regular rates and rhythm, normal S1 S2, no S3 or S4 and no murmur, click or rub -  ABDOMEN:  soft, nontender, no HSM or masses and bowel sounds normal  MS: extremities normal- no gross deformities noted, no evidence of inflammation in joints, FROM in all extremities.  NEURO: Normal strength and tone, sensory exam grossly normal, mentation intact and speech normal  PSYCH: mentation appears normal and affect normal/bright    ASSESSMENT / PLAN:  (Z23) Need for vaccination  (primary encounter diagnosis)  Plan: TDAP VACCINE (ADACEL) [52509.002], 1st          Administration  [84150]            (I10) Essential hypertension with goal blood pressure less than 140/90  Plan: order for DME            (I10) Hypertension goal BP (blood pressure) < 140/90  Comment: a little high  Plan: losartan-hydrochlorothiazide (HYZAAR) 100-25 MG        tablet, amLODIPine (NORVASC) 5 MG tablet        Diet/exercise and 10 " lbs weight loss. Chest pain or shortness of breath to er. Prescription for home blood pressure cuff. Recheck in 6 months sooner if worse.   Discussed high calcium and possible lab error or from recent diet. Recheck in 6 months  Sooner if symptoms. Expected course and warning signs reviewed. Call/email with questions/concerns     Yon Cuellar MD

## 2019-05-06 ENCOUNTER — TELEPHONE (OUTPATIENT)
Dept: FAMILY MEDICINE | Facility: CLINIC | Age: 39
End: 2019-05-06

## 2019-05-06 NOTE — TELEPHONE ENCOUNTER
Patient received a message that the FDA recalled his Losartan, please call to discuss an alternative.

## 2019-05-06 NOTE — TELEPHONE ENCOUNTER
Confirmed pt did get letter from pharmacy that his medication was in the recall.  Advised only some lots were in recall and pharmacy's are usually sending alternate  prescription for replacement.  There is a shortage of the combo pill now so pharmacy's are asking providers to send two prescription's one for losartan and one for hydrochlorothiazide as replacement.  Advised if pt is not comfortable staying on losartan provider can change med.  Pt will contact his pharmacy and then call me back with what they say and if we need to do anything.  [phone].  Sonia Cardenas BSN, RN

## 2019-10-10 ENCOUNTER — TELEPHONE (OUTPATIENT)
Dept: FAMILY MEDICINE | Facility: CLINIC | Age: 39
End: 2019-10-10

## 2019-10-10 DIAGNOSIS — I10 ESSENTIAL HYPERTENSION WITH GOAL BLOOD PRESSURE LESS THAN 140/90: Primary | ICD-10-CM

## 2019-10-10 RX ORDER — LOSARTAN POTASSIUM 100 MG/1
100 TABLET ORAL DAILY
Qty: 90 TABLET | Refills: 1 | Status: SHIPPED | OUTPATIENT
Start: 2019-10-10 | End: 2020-03-11

## 2019-10-10 RX ORDER — HYDROCHLOROTHIAZIDE 25 MG/1
25 TABLET ORAL DAILY
Qty: 90 TABLET | Refills: 1 | Status: SHIPPED | OUTPATIENT
Start: 2019-10-10 | End: 2020-02-24

## 2019-10-10 NOTE — TELEPHONE ENCOUNTER
Separate prescription for losartan 100 mg and hydrochlorothiazide 25 mg sent to pharmacy.  Pt notified and verbalizes understanding of plan.  To provider to cosign.  Sonia MERCEDESN, RN

## 2019-10-10 NOTE — TELEPHONE ENCOUNTER
Message: Losartan/Hctz 100-25 is currently out of stock. Please supp alt or if you wish to transfer the presc out to a Local Pharmacy, please have the Pharmacy contact us. Please advise your pt copay may vary at a Local Pharmacy.    Please be advise the following are NOT avail and currently out of stock by the [facility]: Olmesartan/Hctz 20/12.5mg, 40.12.5mg, 40/25mg. Telmisartan tab 20mg, 40mg, 80mg; Telm/Hctz All Strengths; Irbesartan/Hctz 75mg, 150mg, 300mg, 150/12.5mg, 300/12.5mg; Losartan/Hctz 50/12.5mg, 100/12.5mg, 100-25; Losartan tab 100mg, 50mg.

## 2020-02-22 DIAGNOSIS — I10 ESSENTIAL HYPERTENSION WITH GOAL BLOOD PRESSURE LESS THAN 140/90: ICD-10-CM

## 2020-02-24 RX ORDER — HYDROCHLOROTHIAZIDE 25 MG/1
TABLET ORAL
Qty: 90 TABLET | Refills: 1 | Status: SHIPPED | OUTPATIENT
Start: 2020-02-24 | End: 2020-07-21

## 2020-02-25 ENCOUNTER — OFFICE VISIT (OUTPATIENT)
Dept: FAMILY MEDICINE | Facility: CLINIC | Age: 40
End: 2020-02-25
Payer: COMMERCIAL

## 2020-02-25 VITALS
DIASTOLIC BLOOD PRESSURE: 88 MMHG | SYSTOLIC BLOOD PRESSURE: 133 MMHG | HEART RATE: 92 BPM | WEIGHT: 250.4 LBS | TEMPERATURE: 98.5 F | HEIGHT: 72 IN | RESPIRATION RATE: 16 BRPM | BODY MASS INDEX: 33.92 KG/M2 | OXYGEN SATURATION: 98 %

## 2020-02-25 DIAGNOSIS — I10 ESSENTIAL HYPERTENSION WITH GOAL BLOOD PRESSURE LESS THAN 140/90: ICD-10-CM

## 2020-02-25 DIAGNOSIS — R05.9 COUGH: Primary | ICD-10-CM

## 2020-02-25 PROCEDURE — 99214 OFFICE O/P EST MOD 30 MIN: CPT | Performed by: INTERNAL MEDICINE

## 2020-02-25 RX ORDER — BENZONATATE 100 MG/1
100 CAPSULE ORAL 3 TIMES DAILY PRN
Qty: 20 CAPSULE | Refills: 0 | Status: SHIPPED | OUTPATIENT
Start: 2020-02-25 | End: 2020-07-21

## 2020-02-25 RX ORDER — ALBUTEROL SULFATE 90 UG/1
2 AEROSOL, METERED RESPIRATORY (INHALATION) EVERY 4 HOURS PRN
Qty: 1 INHALER | Refills: 0 | Status: SHIPPED | OUTPATIENT
Start: 2020-02-25 | End: 2023-05-31

## 2020-02-25 ASSESSMENT — MIFFLIN-ST. JEOR: SCORE: 2095.81

## 2020-02-25 NOTE — PROGRESS NOTES
SUBJECTIVE:  Chato Kirkland is an 39 year old male who presents for cough.  Started about a week ago.  Initially though was some seasonal allergies.  After a few days of congestion, cough seemed to worsen.  Some intermittent ear pain.  Was tired.  No fever.  Thought he was improving but then got some chills and had temp up to 101 or 12 four days ago.  Had fever for a couple days but now gone.  Is still coughing.  Had some decreased appetite and fatigue.  Seems to be feeling better other than some ongoing cough.  Cough keeps him up at night.  Cough is not productive and seems to be a lot from nasal drainage.  Some sore throat.  No recent intl travel.  No known exposures but dtr started similar sxs after he did.  Has taken nyquil and ibuprofen and cough drops which help some.    PMH:   has a past medical history of Hypertension.  Patient Active Problem List   Diagnosis     CARDIOVASCULAR SCREENING; LDL GOAL LESS THAN 130     High triglycerides     Seasonal allergic rhinitis     Essential hypertension with goal blood pressure less than 140/90     Social History     Socioeconomic History     Marital status:      Spouse name: None     Number of children: 2     Years of education: None     Highest education level: None   Occupational History     Employer: [employer]   Social Needs     Financial resource strain: None     Food insecurity:     Worry: None     Inability: None     Transportation needs:     Medical: None     Non-medical: None   Tobacco Use     Smoking status: Never Smoker     Smokeless tobacco: Never Used   Substance and Sexual Activity     Alcohol use: Yes     Comment: rare/ once a month      Drug use: No     Sexual activity: Yes     Partners: Female   Lifestyle     Physical activity:     Days per week: None     Minutes per session: None     Stress: None   Relationships     Social connections:     Talks on phone: None     Gets together: None     Attends [employer] service: None     Active  "member of club or organization: None     Attends meetings of clubs or organizations: None     Relationship status: None     Intimate partner violence:     Fear of current or ex partner: None     Emotionally abused: None     Physically abused: None     Forced sexual activity: None   Other Topics Concern     Parent/sibling w/ CABG, MI or angioplasty before 65F 55M? Not Asked   Social History Narrative     None     Family History   Problem Relation Age of Onset     Hypertension Father        ALLERGIES:  Patient has no known allergies.    Current Outpatient Medications   Medication     amLODIPine (NORVASC) 5 MG tablet     fish oil-omega-3 fatty acids (FISH OIL) 1000 MG capsule     hydrochlorothiazide (HYDRODIURIL) 25 MG tablet     losartan (COZAAR) 100 MG tablet     Multiple Vitamin (MULTI VITAMIN MENS PO)     order for DME     losartan-hydrochlorothiazide (HYZAAR) 100-25 MG tablet     No current facility-administered medications for this visit.          ROS:  ROS is done and is negative for general/constitutional, eye, ENT, Respiratory, cardiovascular, GI, , Skin, musculoskeletal except as noted elsewhere.  All other review of systems negative except as noted elsewhere.      OBJECTIVE:  BP (!) 131/90   Pulse 92   Temp 98.5  F (36.9  C) (Oral)   Resp 16   Ht 1.84 m (6' 0.44\")   Wt 113.6 kg (250 lb 6.4 oz)   SpO2 98%   BMI 33.55 kg/m    GENERAL APPEARANCE: Alert, in no acute distress  EYES: normal  EARS: External ears normal. Canals clear. TM's normal.  NOSE:mildly inflamed mucosa  OROPHARYNX:normal  NECK:No adenopathy,masses or thyromegaly  RESP: normal and clear to auscultation  CV:regular rate and rhythm and no murmurs, clicks, or gallops  ABDOMEN: Abdomen soft, non-tender. BS normal. No masses, organomegaly  SKIN: no ulcers, lesions or rash  MUSCULOSKELETAL:Musculoskeletal normal      RESULTS  No results found for any visits on 02/25/20..  No results found for this or any previous visit (from the past 48 " hour(s)).    ASSESSMENT/PLAN:    ASSESSMENT / PLAN:  (R05) Cough  (primary encounter diagnosis)  Comment: sxs c/w viral or post-viral cough.  Currently no evidence of pneumonia.  Will rx albuterol for cough, and tessalon prn  Plan: albuterol (PROAIR HFA/PROVENTIL HFA/VENTOLIN         HFA) 108 (90 Base) MCG/ACT inhaler, benzonatate        (TESSALON) 100 MG capsule        Reviewed medication instructions and side effects. Follow up if experiences side effects.. I reviewed supportive care, otc meds to use if needed, expected course, and signs of concern.  Follow up as needed or if he does not improve within 1 week(s) or if worsens in any way.  Reviewed red flag symptoms and is to go to the ER if experiences any of these.    (I10) Essential hypertension with goal blood pressure less than 140/90  Comment: diastolic bp initially borderline today but improved a little on recheck.    Plan: continue meds and f/u with pcp.  Recheck BP in 1-2 weeks with a nurse visit, [place] pharmacy visit, or a visit to primary care doctor.      See [facility] for orders, medications, letters, patient instructions    Briana Hoffman M.D.

## 2020-03-09 DIAGNOSIS — I10 ESSENTIAL HYPERTENSION WITH GOAL BLOOD PRESSURE LESS THAN 140/90: ICD-10-CM

## 2020-03-09 NOTE — LETTER
March 11, 2020    Chato Isael  [address] [facility] [address]    Dear Chato,       We recently received a refill request for losartan (COZAAR) 100 MG tablet.  We have refilled this for a one time supply only because you are due for a:    Hypertension office visit and fasting lab appointment      Please schedule this lab appointment 4-5 days prior to the office visit.     Please call at your earliest convenience so that there will not be a delay with your future refills.          Thank you,   Your [facility] Team/daniel  [phone]

## 2020-03-11 RX ORDER — LOSARTAN POTASSIUM 100 MG/1
TABLET ORAL
Qty: 30 TABLET | Refills: 0 | Status: SHIPPED | OUTPATIENT
Start: 2020-03-11 | End: 2020-05-15

## 2020-03-11 NOTE — TELEPHONE ENCOUNTER
Medication is being filled for 1 time refill only due to:  Patient needs to be seen for fasting lab appointment and appointment with the provider for further refills. Hypertension  Sonia MERCEDESN, RN

## 2020-05-15 DIAGNOSIS — I10 ESSENTIAL HYPERTENSION WITH GOAL BLOOD PRESSURE LESS THAN 140/90: ICD-10-CM

## 2020-05-15 RX ORDER — LOSARTAN POTASSIUM 100 MG/1
TABLET ORAL
Qty: 90 TABLET | Refills: 0 | Status: SHIPPED | OUTPATIENT
Start: 2020-05-15 | End: 2020-07-21

## 2020-05-15 NOTE — TELEPHONE ENCOUNTER
Called and spoke to the patient @ [phone]. I have scheduled him for a fasting lab appt, on 07/16/2020 and a Blood Pressure medication check on 07/21/2020 with Dr. Cuellar.  Susie Vargas TC

## 2020-05-15 NOTE — TELEPHONE ENCOUNTER
Routing refill request to provider for review/approval because:  Luh given x1 and patient did not follow up, please advise    No appointment pending at this time.  Routing to provider to advise.    Kady Zarate BSN, RN

## 2020-07-13 ENCOUNTER — DOCUMENTATION ONLY (OUTPATIENT)
Dept: LAB | Facility: CLINIC | Age: 40
End: 2020-07-13

## 2020-07-13 DIAGNOSIS — E78.1 HIGH TRIGLYCERIDES: ICD-10-CM

## 2020-07-13 DIAGNOSIS — I10 ESSENTIAL HYPERTENSION WITH GOAL BLOOD PRESSURE LESS THAN 140/90: ICD-10-CM

## 2020-07-13 DIAGNOSIS — Z13.6 CARDIOVASCULAR SCREENING; LDL GOAL LESS THAN 130: Primary | ICD-10-CM

## 2020-07-16 DIAGNOSIS — I10 ESSENTIAL HYPERTENSION WITH GOAL BLOOD PRESSURE LESS THAN 140/90: ICD-10-CM

## 2020-07-16 DIAGNOSIS — Z13.6 CARDIOVASCULAR SCREENING; LDL GOAL LESS THAN 130: ICD-10-CM

## 2020-07-16 DIAGNOSIS — E78.1 HIGH TRIGLYCERIDES: ICD-10-CM

## 2020-07-16 LAB
ALBUMIN SERPL-MCNC: 4 G/DL (ref 3.4–5)
ANION GAP SERPL CALCULATED.3IONS-SCNC: 8 MMOL/L (ref 3–14)
BUN SERPL-MCNC: 12 MG/DL (ref 7–30)
CALCIUM SERPL-MCNC: 9.5 MG/DL (ref 8.5–10.1)
CHLORIDE SERPL-SCNC: 100 MMOL/L (ref 94–109)
CHOLEST SERPL-MCNC: 200 MG/DL
CO2 SERPL-SCNC: 29 MMOL/L (ref 20–32)
CREAT SERPL-MCNC: 0.9 MG/DL (ref 0.66–1.25)
GFR SERPL CREATININE-BSD FRML MDRD: >90 ML/MIN/{1.73_M2}
GLUCOSE SERPL-MCNC: 87 MG/DL (ref 70–99)
HDLC SERPL-MCNC: 34 MG/DL
LDLC SERPL CALC-MCNC: 117 MG/DL
NONHDLC SERPL-MCNC: 166 MG/DL
PHOSPHATE SERPL-MCNC: 3.1 MG/DL (ref 2.5–4.5)
POTASSIUM SERPL-SCNC: 3.5 MMOL/L (ref 3.4–5.3)
SODIUM SERPL-SCNC: 137 MMOL/L (ref 133–144)
TRIGL SERPL-MCNC: 244 MG/DL

## 2020-07-16 PROCEDURE — 80069 RENAL FUNCTION PANEL: CPT | Performed by: FAMILY MEDICINE

## 2020-07-16 PROCEDURE — 36415 COLL VENOUS BLD VENIPUNCTURE: CPT | Performed by: FAMILY MEDICINE

## 2020-07-16 PROCEDURE — 80061 LIPID PANEL: CPT | Performed by: FAMILY MEDICINE

## 2020-07-21 ENCOUNTER — OFFICE VISIT (OUTPATIENT)
Dept: FAMILY MEDICINE | Facility: CLINIC | Age: 40
End: 2020-07-21
Payer: COMMERCIAL

## 2020-07-21 VITALS
SYSTOLIC BLOOD PRESSURE: 132 MMHG | DIASTOLIC BLOOD PRESSURE: 88 MMHG | TEMPERATURE: 98 F | HEART RATE: 96 BPM | WEIGHT: 257 LBS | RESPIRATION RATE: 16 BRPM | BODY MASS INDEX: 34.43 KG/M2 | OXYGEN SATURATION: 97 %

## 2020-07-21 DIAGNOSIS — I10 ESSENTIAL HYPERTENSION WITH GOAL BLOOD PRESSURE LESS THAN 140/90: ICD-10-CM

## 2020-07-21 DIAGNOSIS — M19.011 ARTHRITIS OF RIGHT ACROMIOCLAVICULAR JOINT: Primary | ICD-10-CM

## 2020-07-21 DIAGNOSIS — I10 HYPERTENSION GOAL BP (BLOOD PRESSURE) < 140/90: ICD-10-CM

## 2020-07-21 PROCEDURE — 99214 OFFICE O/P EST MOD 30 MIN: CPT | Performed by: FAMILY MEDICINE

## 2020-07-21 RX ORDER — LOSARTAN POTASSIUM 100 MG/1
TABLET ORAL
Qty: 90 TABLET | Refills: 0 | OUTPATIENT
Start: 2020-07-21

## 2020-07-21 RX ORDER — LOSARTAN POTASSIUM AND HYDROCHLOROTHIAZIDE 25; 100 MG/1; MG/1
1 TABLET ORAL EVERY MORNING
Qty: 90 TABLET | Refills: 3 | Status: SHIPPED | OUTPATIENT
Start: 2020-07-21 | End: 2021-07-29

## 2020-07-21 RX ORDER — AMLODIPINE BESYLATE 5 MG/1
TABLET ORAL
Qty: 90 TABLET | Refills: 3 | Status: SHIPPED | OUTPATIENT
Start: 2020-07-21 | End: 2022-02-10

## 2020-07-21 NOTE — PROGRESS NOTES
SUBJECTIVE:  Chato Kirkland, a 39 year old male scheduled an appointment to discuss the following issues:  Follow-up htn, high triglycerides and obesity. + weight gain. Outside  Blood pressure reading ok. Will need to exercise more. No family history early mi/cva. Parents alive.   No chest pain or shortness of breath. No nausea, vomiting or diarrhea or black/bloody stools. No urine changes or hematuria.   Rare albuterol needed in past - cold  Symptoms in past.   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   Medical, social, surgical, and family histories reviewed.    ROS:  All other ROS negative.     OBJECTIVE:  /88   Pulse 96   Temp 98  F (36.7  C) (Tympanic)   Resp 16   Wt 116.6 kg (257 lb)   SpO2 97%   BMI 34.43 kg/m    EXAM:  GENERAL APPEARANCE: healthy, alert and no distress  NECK: no adenopathy, no asymmetry, masses, or scars and thyroid normal to palpation  RESP: lungs clear to auscultation - no rales, rhonchi or wheezes  CV: regular rates and rhythm, normal S1 S2, no S3 or S4 and no murmur, click or rub -  ABDOMEN:  soft, nontender, no HSM or masses and bowel sounds normal  MS: extremities normal- no gross deformities noted, no evidence of inflammation in joints, FROM in all extremities.  MS:mild tenderness right ACjoint.   SKIN: no suspicious lesions or rashes  NEURO: Normal strength and tone, sensory exam grossly normal, mentation intact and speech normal  PSYCH: mentation appears normal and affect normal/bright    ASSESSMENT / PLAN:  (M19.011) Arthritis of right acromioclavicular joint  (primary encounter diagnosis)  Comment: chronic  Plan: ok for crossbow license. Tylenol/heat/ice and follow-up ortho if worse.     (I10) Hypertension goal BP (blood pressure) < 140/90  Comment: stable  Plan: losartan-hydrochlorothiazide (HYZAAR) 100-25 MG        tablet, amLODIPine (NORVASC) 5 MG tablet        Weight  loss/exercise. Reveiwed risks and side effects of medication  Chest pain or shortness of breath to er. Continue self-monitor and return to clinic if worse.     Yon Cuellar MD

## 2021-04-19 NOTE — TELEPHONE ENCOUNTER
Panel Management Review      Patient has the following on his problem list:     Hypertension   Last three blood pressure readings:  BP Readings from Last 3 Encounters:   03/01/18 (!) 147/92   01/29/18 (!) 151/95   06/16/16 132/84     Blood pressure: FAILED    HTN Guidelines:  Age 18-59 BP range:  Less than 140/90  Age 60-85 with Diabetes:  Less than 140/90  Age 60-85 without Diabetes:  less than 150/90      Composite cancer screening  Chart review shows that this patient is due/due soon for the following None  Summary:    Patient is due/failing the following:   BP CHECK    Action needed:   Patient needs office visit for b/p check.    Type of outreach:    Sent Care[employer] message.    Questions for provider review:    None                                                                                                                                    Sonia Clark CMA     Chart routed to 0 .           0

## 2021-07-29 DIAGNOSIS — I10 HYPERTENSION GOAL BP (BLOOD PRESSURE) < 140/90: ICD-10-CM

## 2021-07-29 RX ORDER — LOSARTAN POTASSIUM AND HYDROCHLOROTHIAZIDE 25; 100 MG/1; MG/1
TABLET ORAL
Qty: 90 TABLET | Refills: 0 | Status: SHIPPED | OUTPATIENT
Start: 2021-07-29 | End: 2021-10-31

## 2021-07-29 NOTE — LETTER
July 29, 2021    Chato Kirkland  [address] [facility] [address]    Dear Chato,       We recently received a refill request for losartan-hydrochlorothiazide (HYZAAR) 100-25 MG tablet.  We have refilled this for a one time 90 day supply only because you are due for a:    Hypertension office visit and fasting lab appointment      Please schedule this lab appointment 4-5 days prior to the office visit.     Please call at your earliest convenience so that there will not be a delay with your future refills.          Thank you,   Your [facility] Team/sp  [phone]

## 2021-07-29 NOTE — TELEPHONE ENCOUNTER
"Requested Prescriptions   Pending Prescriptions Disp Refills    losartan-hydrochlorothiazide (HYZAAR) 100-25 MG tablet [Pharmacy Med Name: LOSARTAN/HCTZ 100-25MG TABLET] 90 tablet 3     Sig: TAKE 1 TABLET BY MOUTH IN  THE MORNING FOR BLOOD  PRESSURE        Angiotensin-II Receptors Failed - 7/29/2021  4:02 AM        Failed - Last blood pressure under 140/90 in past 12 months       BP Readings from Last 3 Encounters:   07/21/20 132/88   02/25/20 133/88   03/26/19 131/88                 Failed - Recent (12 mo) or future (30 days) visit within the authorizing provider's specialty     Patient has had an office visit with the authorizing provider or a provider within the authorizing providers department within the previous 12 mos or has a future within next 30 days. See \"Patient Info\" tab in inbasket, or \"Choose Columns\" in Meds & Orders section of the refill encounter.              Failed - Normal serum creatinine on file in past 12 months     Recent Labs   Lab Test 07/16/20  0916   CR 0.90       Ok to refill medication if creatinine is low          Failed - Normal serum potassium on file in past 12 months       Recent Labs   Lab Test 07/16/20  0916   POTASSIUM 3.5                    Passed - Medication is active on med list        Passed - Patient is age 18 or older       Diuretics (Including Combos) Protocol Failed - 7/29/2021  4:02 AM        Failed - Blood pressure under 140/90 in past 12 months       BP Readings from Last 3 Encounters:   07/21/20 132/88   02/25/20 133/88   03/26/19 131/88                 Failed - Recent (12 mo) or future (30 days) visit within the authorizing provider's specialty     Patient has had an office visit with the authorizing provider or a provider within the authorizing providers department within the previous 12 mos or has a future within next 30 days. See \"Patient Info\" tab in inbasket, or \"Choose Columns\" in Meds & Orders section of the refill encounter.              Failed - Normal " serum creatinine on file in past 12 months       Recent Labs   Lab Test 07/16/20 0916   CR 0.90              Failed - Normal serum potassium on file in past 12 months       Recent Labs   Lab Test 07/16/20 0916   POTASSIUM 3.5                    Failed - Normal serum sodium on file in past 12 months       Recent Labs   Lab Test 07/16/20 0916                 Passed - Medication is active on med list        Passed - Patient is age 18 or older

## 2021-08-15 ENCOUNTER — TRANSFERRED RECORDS (OUTPATIENT)
Dept: HEALTH INFORMATION MANAGEMENT | Facility: CLINIC | Age: 41
End: 2021-08-15

## 2021-10-31 DIAGNOSIS — I10 HYPERTENSION GOAL BP (BLOOD PRESSURE) < 140/90: ICD-10-CM

## 2021-10-31 RX ORDER — LOSARTAN POTASSIUM AND HYDROCHLOROTHIAZIDE 25; 100 MG/1; MG/1
TABLET ORAL
Qty: 30 TABLET | Refills: 0 | Status: SHIPPED | OUTPATIENT
Start: 2021-10-31 | End: 2022-01-19

## 2021-10-31 NOTE — LETTER
November 1, 2021    Chato Kirkland  [address] [facility] [address]    Dear Chato,       We recently received a refill request for losartan-hydrochlorothiazide (HYZAAR).  We have refilled this for a one time 30 day supply only because you are due for a:    Blood Pressure office visit and a FASTING LAB APPT. About 3-5 days prior to appt. For FURTHER REFILLS.    Please call at your earliest convenience so that there will not be a delay with your future refills.          Thank you,   Your [facility] Team/[facility]  [phone]

## 2021-12-06 NOTE — TELEPHONE ENCOUNTER
Called and spoke to patient. He said that he is getting billed for his lab work that he had before his physical. I called billing(spoke to Cristhian) and he said that it is coded correctly and does have a preventative code with it. He said that he will do a code review and that the patient will receive a letter in a couple weeks with the outcome. I called the patient back and I informed him of this. I also told him that he may want to call his insurance company to see what their reason was that they did not cover it.Abiola Blake MA/TC     +generalized weakness

## 2022-01-19 DIAGNOSIS — I10 HYPERTENSION GOAL BP (BLOOD PRESSURE) < 140/90: ICD-10-CM

## 2022-01-19 RX ORDER — LOSARTAN POTASSIUM AND HYDROCHLOROTHIAZIDE 25; 100 MG/1; MG/1
TABLET ORAL
Qty: 90 TABLET | Refills: 0 | Status: SHIPPED | OUTPATIENT
Start: 2022-01-19 | End: 2022-02-10

## 2022-01-19 NOTE — TELEPHONE ENCOUNTER
Routing refill request to provider for review/approval because:  Labs not current:    Creatinine   Date Value Ref Range Status   07/16/2020 0.90 0.66 - 1.25 mg/dL Final     Potassium   Date Value Ref Range Status   07/16/2020 3.5 3.4 - 5.3 mmol/L Final     Sodium   Date Value Ref Range Status   07/16/2020 137 133 - 144 mmol/L Final       BP Readings from Last 6 Encounters:   07/21/20 132/88   02/25/20 133/88   03/26/19 131/88   03/01/18 (!) 147/92   01/29/18 (!) 151/95   06/16/16 132/84     Next 5 appointments (look out 90 days)    Feb 10, 2022 11:30 AM  (Arrive by 11:10 AM)  Adult Preventative Visit with Yon Cuellar MD  [facility] ([facility] ) [address] [facility] [address] [phone]          Josefa Fischer RN

## 2022-02-03 ASSESSMENT — ENCOUNTER SYMPTOMS
JOINT SWELLING: 0
DIARRHEA: 0
NAUSEA: 0
WEAKNESS: 0
DIZZINESS: 0
CONSTIPATION: 0
HEADACHES: 0
COUGH: 0
DYSURIA: 0
MYALGIAS: 0
HEARTBURN: 0
CHILLS: 0
FEVER: 0
ABDOMINAL PAIN: 0
EYE PAIN: 0
PALPITATIONS: 0
HEMATOCHEZIA: 0
SHORTNESS OF BREATH: 0
PARESTHESIAS: 0
SORE THROAT: 0
HEMATURIA: 0
FREQUENCY: 0
ARTHRALGIAS: 0
NERVOUS/ANXIOUS: 0

## 2022-02-07 ENCOUNTER — DOCUMENTATION ONLY (OUTPATIENT)
Dept: LAB | Facility: CLINIC | Age: 42
End: 2022-02-07
Payer: COMMERCIAL

## 2022-02-07 DIAGNOSIS — E78.1 HIGH TRIGLYCERIDES: Primary | ICD-10-CM

## 2022-02-07 DIAGNOSIS — I10 ESSENTIAL HYPERTENSION WITH GOAL BLOOD PRESSURE LESS THAN 140/90: ICD-10-CM

## 2022-02-07 NOTE — PROGRESS NOTES
Chato QUINONES Isael has an upcoming lab appointment:    Future Appointments   Date Time Provider Department Center   2/8/2022 11:00 AM [facility]   2/10/2022 11:30 AM Yon Cuellar MD [facility]     Patient is scheduled for the following lab(s): TOMORROW.    There is no order available. Please review and place either future orders or HMPO (Review of Health Maintenance Protocol Orders), as appropriate.    Health Maintenance Due   Topic     ANNUAL REVIEW OF HM ORDERS      HIV SCREENING      HEPATITIS C SCREENING      LIPID      Mona Vargas

## 2022-02-08 ENCOUNTER — LAB (OUTPATIENT)
Dept: LAB | Facility: CLINIC | Age: 42
End: 2022-02-08
Payer: COMMERCIAL

## 2022-02-08 DIAGNOSIS — E78.1 HIGH TRIGLYCERIDES: ICD-10-CM

## 2022-02-08 DIAGNOSIS — I10 ESSENTIAL HYPERTENSION WITH GOAL BLOOD PRESSURE LESS THAN 140/90: ICD-10-CM

## 2022-02-08 LAB
ALBUMIN SERPL-MCNC: 4.1 G/DL (ref 3.4–5)
ALP SERPL-CCNC: 51 U/L (ref 40–150)
ALT SERPL W P-5'-P-CCNC: 42 U/L (ref 0–70)
ANION GAP SERPL CALCULATED.3IONS-SCNC: 4 MMOL/L (ref 3–14)
AST SERPL W P-5'-P-CCNC: 20 U/L (ref 0–45)
BILIRUB SERPL-MCNC: 0.7 MG/DL (ref 0.2–1.3)
BUN SERPL-MCNC: 16 MG/DL (ref 7–30)
CALCIUM SERPL-MCNC: 9.6 MG/DL (ref 8.5–10.1)
CHLORIDE BLD-SCNC: 101 MMOL/L (ref 94–109)
CHOLEST SERPL-MCNC: 203 MG/DL
CO2 SERPL-SCNC: 32 MMOL/L (ref 20–32)
CREAT SERPL-MCNC: 0.83 MG/DL (ref 0.66–1.25)
FASTING STATUS PATIENT QL REPORTED: YES
GFR SERPL CREATININE-BSD FRML MDRD: >90 ML/MIN/1.73M2
GLUCOSE BLD-MCNC: 93 MG/DL (ref 70–99)
HDLC SERPL-MCNC: 39 MG/DL
LDLC SERPL CALC-MCNC: 115 MG/DL
NONHDLC SERPL-MCNC: 164 MG/DL
POTASSIUM BLD-SCNC: 3.9 MMOL/L (ref 3.4–5.3)
PROT SERPL-MCNC: 8.3 G/DL (ref 6.8–8.8)
SODIUM SERPL-SCNC: 137 MMOL/L (ref 133–144)
TRIGL SERPL-MCNC: 247 MG/DL

## 2022-02-08 PROCEDURE — 80053 COMPREHEN METABOLIC PANEL: CPT

## 2022-02-08 PROCEDURE — 36415 COLL VENOUS BLD VENIPUNCTURE: CPT

## 2022-02-08 PROCEDURE — 80061 LIPID PANEL: CPT

## 2022-02-10 ENCOUNTER — OFFICE VISIT (OUTPATIENT)
Dept: FAMILY MEDICINE | Facility: CLINIC | Age: 42
End: 2022-02-10
Payer: COMMERCIAL

## 2022-02-10 VITALS
TEMPERATURE: 97.3 F | OXYGEN SATURATION: 99 % | RESPIRATION RATE: 18 BRPM | DIASTOLIC BLOOD PRESSURE: 87 MMHG | HEART RATE: 78 BPM | BODY MASS INDEX: 34.46 KG/M2 | WEIGHT: 260 LBS | SYSTOLIC BLOOD PRESSURE: 129 MMHG | HEIGHT: 73 IN

## 2022-02-10 DIAGNOSIS — E78.1 HIGH TRIGLYCERIDES: ICD-10-CM

## 2022-02-10 DIAGNOSIS — I10 HYPERTENSION GOAL BP (BLOOD PRESSURE) < 140/90: ICD-10-CM

## 2022-02-10 DIAGNOSIS — Z00.00 ROUTINE HISTORY AND PHYSICAL EXAMINATION OF ADULT: Primary | ICD-10-CM

## 2022-02-10 PROCEDURE — 99214 OFFICE O/P EST MOD 30 MIN: CPT | Mod: 25 | Performed by: FAMILY MEDICINE

## 2022-02-10 PROCEDURE — 99396 PREV VISIT EST AGE 40-64: CPT | Performed by: FAMILY MEDICINE

## 2022-02-10 RX ORDER — LOSARTAN POTASSIUM AND HYDROCHLOROTHIAZIDE 25; 100 MG/1; MG/1
TABLET ORAL
Qty: 90 TABLET | Refills: 1 | Status: SHIPPED | OUTPATIENT
Start: 2022-02-10 | End: 2022-09-06

## 2022-02-10 RX ORDER — AMLODIPINE BESYLATE 5 MG/1
TABLET ORAL
Qty: 90 TABLET | Refills: 1 | Status: SHIPPED | OUTPATIENT
Start: 2022-02-10 | End: 2023-11-24

## 2022-02-10 ASSESSMENT — ENCOUNTER SYMPTOMS
ARTHRALGIAS: 0
SHORTNESS OF BREATH: 0
NERVOUS/ANXIOUS: 0
DIZZINESS: 0
HEMATURIA: 0
DYSURIA: 0
HEMATOCHEZIA: 0
HEARTBURN: 0
FEVER: 0
MYALGIAS: 0
EYE PAIN: 0
ABDOMINAL PAIN: 0
CHILLS: 0
HEADACHES: 0
PALPITATIONS: 0
COUGH: 0
NAUSEA: 0
JOINT SWELLING: 0
CONSTIPATION: 0
DIARRHEA: 0
WEAKNESS: 0
SORE THROAT: 0
PARESTHESIAS: 0
FREQUENCY: 0

## 2022-02-10 ASSESSMENT — MIFFLIN-ST. JEOR: SCORE: 2138.23

## 2022-02-10 NOTE — PROGRESS NOTES
SUBJECTIVE:   CC: Chato Kirkland is an 41 year old male who presents for preventative health visit.   Follow-up htn, high triglycerides and obesity  Cut down on ALCOHOL - limited known. 15,13yo girls at home.  No chest pain or shortness of breath. Increase exercise and weight loss.   Coffee and occasionally pop. No nausea, vomiting or diarrhea or black/bloody stools. No urine changes. Emotionally doing ok. Sleep better. No marshall noted.  No rashes/mole changes. Drinking milk and vitaminD. No testicle pain/hernia/masses or std concerns.  Marriage ok. Work - mortgage going ok.   Stress with eldest daughter - improving. Outside blood pressure cuff - going ok but higher in AM  Patient has been advised of split billing requirements and indicates understanding: Yes  Healthy Habits:     Getting at least 3 servings of Calcium per day:  Yes    Bi-annual eye exam:  NO    Dental care twice a year:  Yes    Sleep apnea or symptoms of sleep apnea:  None    Diet:  Low fat/cholesterol    Frequency of exercise:  2-3 days/week    Duration of exercise:  30-45 minutes    Taking medications regularly:  Yes    Medication side effects:  None    PHQ-2 Total Score: 0    Additional concerns today:  No      Today's PHQ-2 Score:   PHQ-2 ( 1999 [employer]) 2/3/2022   Q1: Little interest or pleasure in doing things 0   Q2: Feeling down, depressed or hopeless 0   PHQ-2 Score 0   PHQ-2 Total Score (12-17 Years)- Positive if 3 or more points; Administer PHQ-A if positive -   Q1: Little interest or pleasure in doing things Not at all   Q2: Feeling down, depressed or hopeless Not at all   PHQ-2 Score 0       Abuse: Current or Past(Physical, Sexual or Emotional)- No  Do you feel safe in your environment? Yes        Social History     Tobacco Use     Smoking status: Never Smoker     Smokeless tobacco: Never Used   Substance Use Topics     Alcohol use: Yes     Comment: rare/ once a month      If you drink alcohol do you typically have >3 drinks per day or >7  "drinks per week? No    Alcohol Use 2/3/2022   Prescreen: >3 drinks/day or >7 drinks/week? No     Last PSA: No results found for: PSA    Reviewed orders with patient. Reviewed health maintenance and updated orders accordingly - Yes  Labs reviewed in EPIC    Reviewed and updated as needed this visit by clinical staff                Reviewed and updated as needed this visit by Provider                 Review of Systems   Constitutional: Negative for chills and fever.   HENT: Negative for congestion, ear pain, hearing loss and sore throat.    Eyes: Negative for pain and visual disturbance.   Respiratory: Negative for cough and shortness of breath.    Cardiovascular: Negative for chest pain, palpitations and peripheral edema.   Gastrointestinal: Negative for abdominal pain, constipation, diarrhea, heartburn, hematochezia and nausea.   Genitourinary: Negative for dysuria, frequency, genital sores, hematuria, impotence, penile discharge and urgency.   Musculoskeletal: Negative for arthralgias, joint swelling and myalgias.   Skin: Negative for rash.   Neurological: Negative for dizziness, weakness, headaches and paresthesias.   Psychiatric/Behavioral: Negative for mood changes. The patient is not nervous/anxious.      All other ROS negative.     OBJECTIVE:   /87   Pulse 78   Temp 97.3  F (36.3  C) (Tympanic)   Resp 18   Ht 1.854 m (6' 1\")   Wt 117.9 kg (260 lb)   SpO2 99%   BMI 34.30 kg/m      Physical Exam  GENERAL: healthy, alert and no distress  EYES: Eyes grossly normal to inspection, PERRL and conjunctivae and sclerae normal  HENT: ear canals and TM's normal, nose and mouth without ulcers or lesions  NECK: no adenopathy, no asymmetry, masses, or scars and thyroid normal to palpation  RESP: lungs clear to auscultation - no rales, rhonchi or wheezes  BREAST: normal without masses, tenderness or nipple discharge and no palpable axillary masses or adenopathy  CV: regular rate and rhythm, normal S1 S2, no S3 or " "S4, no murmur, click or rub, no peripheral edema and peripheral pulses strong  ABDOMEN: soft, nontender, no hepatosplenomegaly, no masses and bowel sounds normal   (male): patient deferred /rectal exams  SKIN: no suspicious lesions or rashes  NEURO: Normal strength and tone, mentation intact and speech normal  PSYCH: mentation appears normal, affect normal/bright  LYMPH: no cervical, supraclavicular, axillary adenopathy    ASSESSMENT/PLAN:     ASSESSMENT / PLAN:  (Z00.00) Routine history and physical examination of adult  (primary encounter diagnosis)  Comment: generally healthy and normal exam/labs except below  Plan: Reviewed self mole/testicle check handout.  VitaminD.     (I10) Hypertension goal BP (blood pressure) < 140/90  Comment: stable  Plan: amLODIPine (NORVASC) 5 MG tablet,         losartan-hydrochlorothiazide (HYZAAR) 100-25 MG        tablet        Consider doubling norvasc to 10mg. Continue self-monitor. Chest pain or shortness of breath to er. Recheck in 6 months  Sooner if worse. Lower sodium diet    (E78.1) High triglycerides  Comment: stable but high. LDL ok  Plan: low carb diet. Continue increase exercise and diet and weight loss.       Patient has been advised of split billing requirements and indicates understanding: Yes    COUNSELING:   Reviewed preventive health counseling, as reflected in patient instructions       Regular exercise       Healthy diet/nutrition       Vision screening       Hearing screening       Aspirin prophylaxis        Alcohol Use        Osteoporosis prevention/bone health    Estimated body mass index is 34.43 kg/m  as calculated from the following:    Height as of 2/25/20: 1.84 m (6' 0.44\").    Weight as of 7/21/20: 116.6 kg (257 lb).     Weight management plan: Discussed healthy diet and exercise guidelines    He reports that he has never smoked. He has never used smokeless tobacco.      Counseling Resources:  ATP IV Guidelines  Pooled Cohorts Equation " Calculator  FRAX Risk Assessment  ICSI Preventive Guidelines  Dietary Guidelines for Americans, 2010  USDA's MyPlate  ASA Prophylaxis  Lung CA Screening    Yon Cuellar MD  [facility]

## 2022-09-06 DIAGNOSIS — I10 HYPERTENSION GOAL BP (BLOOD PRESSURE) < 140/90: ICD-10-CM

## 2022-09-06 RX ORDER — LOSARTAN POTASSIUM AND HYDROCHLOROTHIAZIDE 25; 100 MG/1; MG/1
TABLET ORAL
Qty: 90 TABLET | Refills: 1 | Status: SHIPPED | OUTPATIENT
Start: 2022-09-06 | End: 2023-01-16

## 2023-01-16 DIAGNOSIS — I10 HYPERTENSION GOAL BP (BLOOD PRESSURE) < 140/90: ICD-10-CM

## 2023-01-16 RX ORDER — LOSARTAN POTASSIUM AND HYDROCHLOROTHIAZIDE 25; 100 MG/1; MG/1
TABLET ORAL
Qty: 90 TABLET | Refills: 0 | Status: SHIPPED | OUTPATIENT
Start: 2023-01-16 | End: 2023-07-21

## 2023-01-19 ENCOUNTER — TELEPHONE (OUTPATIENT)
Dept: FAMILY MEDICINE | Facility: CLINIC | Age: 43
End: 2023-01-19

## 2023-01-19 NOTE — TELEPHONE ENCOUNTER
Tried calling patient, it went to voicemail. It would not let me leave a message. It kept saying that nothing has been recorded.    No medication was mentioned in the telephone message, or, what pharmacy the medication needs to go to.    I sent a [employer] 360t message.Abiola Blake MA/NELA

## 2023-01-19 NOTE — TELEPHONE ENCOUNTER
Reason for Call:  Other prescription    Detailed comments: oemr was following up with medication refill since he only has one week left and this medication needs to be mailed.  He has a new carrier that is in last encounter.    Phone Number Patient can be reached at: Home number on file [phone] (home)    Best Time: any    Can we leave a detailed message on this number? YES    Call taken on 1/19/2023 at 10:24 AM by Madison Garcia

## 2023-05-28 DIAGNOSIS — I10 HYPERTENSION GOAL BP (BLOOD PRESSURE) < 140/90: ICD-10-CM

## 2023-05-30 RX ORDER — LOSARTAN POTASSIUM AND HYDROCHLOROTHIAZIDE 25; 100 MG/1; MG/1
TABLET ORAL
Qty: 90 TABLET | Refills: 3 | OUTPATIENT
Start: 2023-05-30

## 2023-05-31 ENCOUNTER — OFFICE VISIT (OUTPATIENT)
Dept: FAMILY MEDICINE | Facility: CLINIC | Age: 43
End: 2023-05-31
Payer: COMMERCIAL

## 2023-05-31 VITALS
RESPIRATION RATE: 17 BRPM | WEIGHT: 266 LBS | TEMPERATURE: 98.2 F | BODY MASS INDEX: 35.25 KG/M2 | HEIGHT: 73 IN | SYSTOLIC BLOOD PRESSURE: 148 MMHG | DIASTOLIC BLOOD PRESSURE: 94 MMHG | OXYGEN SATURATION: 96 % | HEART RATE: 103 BPM

## 2023-05-31 DIAGNOSIS — N50.812 PAIN IN LEFT TESTICLE: Primary | ICD-10-CM

## 2023-05-31 LAB
ALBUMIN UR-MCNC: 30 MG/DL
APPEARANCE UR: CLEAR
BACTERIA #/AREA URNS HPF: ABNORMAL /HPF
BILIRUB UR QL STRIP: NEGATIVE
COLOR UR AUTO: YELLOW
GLUCOSE UR STRIP-MCNC: NEGATIVE MG/DL
HGB UR QL STRIP: NEGATIVE
KETONES UR STRIP-MCNC: ABNORMAL MG/DL
LEUKOCYTE ESTERASE UR QL STRIP: NEGATIVE
NITRATE UR QL: NEGATIVE
PH UR STRIP: 6 [PH] (ref 5–7)
RBC #/AREA URNS AUTO: ABNORMAL /HPF
SP GR UR STRIP: 1.02 (ref 1–1.03)
UROBILINOGEN UR STRIP-ACNC: 0.2 E.U./DL
WBC #/AREA URNS AUTO: ABNORMAL /HPF

## 2023-05-31 PROCEDURE — 99214 OFFICE O/P EST MOD 30 MIN: CPT | Performed by: FAMILY MEDICINE

## 2023-05-31 PROCEDURE — 81001 URINALYSIS AUTO W/SCOPE: CPT | Performed by: FAMILY MEDICINE

## 2023-05-31 ASSESSMENT — PAIN SCALES - GENERAL: PAINLEVEL: NO PAIN (0)

## 2023-05-31 NOTE — PROGRESS NOTES
Assessment & Plan     Pain in left testicle  - UA with Microscopic reflex to Culture - lab collect; Future  - US Testicular & Scrotum w Doppler Ltd; Future  - UA with Microscopic reflex to Culture - lab collect  - UA Microscopic with Reflex to Culture      Michelle Mckeon MD  [facility] JARRETT Reis is a 42 year old, presenting for the following health issues:  Testicular Problem        5/31/2023     1:57 PM   Additional Questions   Roomed by VE   Accompanied by SELF     History of Present Illness       Reason for visit:  Lump in scrotum    He eats 2-3 servings of fruits and vegetables daily.He consumes 1 sweetened beverage(s) daily.He exercises with enough effort to increase his heart rate 20 to 29 minutes per day.  He exercises with enough effort to increase his heart rate 4 days per week.   He is taking medications regularly.       Testicular Problem  Onset/Duration: unsure  Description:   Dysuria (painful urination): No  Hematuria (blood in urine): No  Frequency: No  Waking at night to urinate: No  Hesitancy (delay in urine): No  Retention (unable to empty): No  Decrease in urinary flow: trickling after urinating  Incontinence: No  Progression of Symptoms:  worsening  Accompanying Signs & Symptoms:  Fever: No  Back/Flank pain: No  Urethral discharge: No  Testicle lumps/masses/pain: YES  Nausea and/or vomiting: No  Abdominal pain: No  History:   History of frequent UTI s: No  History of kidney stones: YES  History of hernias: No  Personal or Family history of Prostate problems: No  Sexually active: YES  Precipitating or alleviating factors: None    Therapies tried and outcome:  None          Review of Systems   Constitutional, HEENT, cardiovascular, pulmonary, GI, , musculoskeletal, neuro, skin, endocrine and psych systems are negative, except as otherwise noted.      Objective    BP (!) 148/94 (BP Location: Left arm, Patient Position: Chair, Cuff Size: Adult Large)    "Pulse 103   Temp 98.2  F (36.8  C) (Temporal)   Resp 17   Ht 1.854 m (6' 1\")   Wt 120.7 kg (266 lb)   SpO2 96%   BMI 35.09 kg/m    Body mass index is 35.09 kg/m .  Physical Exam   GENERAL: healthy, alert and no distress   (male): testicles normal without atrophy or masses, no hernias and penis normal without urethral discharge            "

## 2023-06-01 ENCOUNTER — ANCILLARY PROCEDURE (OUTPATIENT)
Dept: ULTRASOUND IMAGING | Facility: CLINIC | Age: 43
End: 2023-06-01
Attending: FAMILY MEDICINE
Payer: COMMERCIAL

## 2023-06-01 DIAGNOSIS — N50.812 PAIN IN LEFT TESTICLE: ICD-10-CM

## 2023-06-01 DIAGNOSIS — N45.1 ACUTE EPIDIDYMITIS: Primary | ICD-10-CM

## 2023-06-01 PROCEDURE — 93976 VASCULAR STUDY: CPT | Mod: TC | Performed by: RADIOLOGY

## 2023-06-01 PROCEDURE — 76870 US EXAM SCROTUM: CPT | Mod: TC | Performed by: RADIOLOGY

## 2023-06-01 RX ORDER — LEVOFLOXACIN 500 MG/1
500 TABLET, FILM COATED ORAL DAILY
Qty: 10 TABLET | Refills: 0 | Status: SHIPPED | OUTPATIENT
Start: 2023-06-01 | End: 2023-11-24

## 2023-06-02 ENCOUNTER — HEALTH MAINTENANCE LETTER (OUTPATIENT)
Age: 43
End: 2023-06-02

## 2023-06-02 NOTE — PROGRESS NOTES
Monogamous in 18 year  to wife. No Anal intercourse. I reviewed ultrasound result with the patient.  Michelle Mckeon MD on 6/1/2023 at 7:57 PM

## 2023-07-21 DIAGNOSIS — I10 HYPERTENSION GOAL BP (BLOOD PRESSURE) < 140/90: ICD-10-CM

## 2023-07-21 RX ORDER — LOSARTAN POTASSIUM AND HYDROCHLOROTHIAZIDE 25; 100 MG/1; MG/1
TABLET ORAL
Qty: 90 TABLET | Refills: 0 | Status: SHIPPED | OUTPATIENT
Start: 2023-07-21 | End: 2023-09-21

## 2023-07-21 NOTE — LETTER
July 21, 2023    Chato Kirkland  [address] [facility] [address]    Dear Chato,       We recently received a refill request for  losartan-hydrochlorothiazide (HYZAAR) 100-25 MG tablet.  We have refilled this for a one time 90 day supply only because you are due for a:    Medication Check office visit and Pre-Visit lab appointment      Please schedule an office visit with your provider and a lab appointment 4-5 days prior to the office visit.     Please call at your earliest convenience so that there will not be a delay with your future refills.          Thank you,   Your [facility] Team/[place]  [phone]

## 2023-09-14 DIAGNOSIS — I10 HYPERTENSION GOAL BP (BLOOD PRESSURE) < 140/90: ICD-10-CM

## 2023-09-15 RX ORDER — LOSARTAN POTASSIUM AND HYDROCHLOROTHIAZIDE 25; 100 MG/1; MG/1
TABLET ORAL
Qty: 90 TABLET | Refills: 3 | OUTPATIENT
Start: 2023-09-15

## 2023-09-21 DIAGNOSIS — I10 HYPERTENSION GOAL BP (BLOOD PRESSURE) < 140/90: ICD-10-CM

## 2023-09-21 RX ORDER — LOSARTAN POTASSIUM AND HYDROCHLOROTHIAZIDE 25; 100 MG/1; MG/1
TABLET ORAL
Qty: 90 TABLET | Refills: 0 | Status: SHIPPED | OUTPATIENT
Start: 2023-09-21 | End: 2023-11-24

## 2023-09-21 NOTE — TELEPHONE ENCOUNTER
Can you refill his medication, to get to the appointment in November?Abiola RONDON [facility]

## 2023-09-21 NOTE — TELEPHONE ENCOUNTER
Reason for Call:  Appointment Request    Patient requesting this type of appt:  Preventive     Requested provider: Yon Cuellar    Reason patient unable to be scheduled: Not within requested timeframe    When does patient want to be seen/preferred time: 1-2 weeks    Comments:  Pt scheduled an appt for physical and med check on 11/24, would like to be seen sooner or do refill for losartan- ahctz  100-25 mg    [facility] or would you prefer to receive a phone call?:   Patient would prefer a phone call   Okay to leave a detailed message?: Yes at Cell number on file:    Telephone Information:   Mobile [phone]       Call taken on 9/21/2023 at 1:41 PM by My Vele[employer]

## 2023-11-09 ENCOUNTER — DOCUMENTATION ONLY (OUTPATIENT)
Dept: FAMILY MEDICINE | Facility: CLINIC | Age: 43
End: 2023-11-09
Payer: COMMERCIAL

## 2023-11-09 DIAGNOSIS — Z12.5 SCREENING PSA (PROSTATE SPECIFIC ANTIGEN): ICD-10-CM

## 2023-11-09 DIAGNOSIS — I10 ESSENTIAL HYPERTENSION WITH GOAL BLOOD PRESSURE LESS THAN 140/90: Primary | ICD-10-CM

## 2023-11-09 NOTE — PROGRESS NOTES
Chato QUINONES Isael has an upcoming lab appointment:    Future Appointments   Date Time Provider Department Center   11/17/2023  8:30 AM [facility]   11/24/2023 11:00 AM Yon Cuellar MD [facility]     There is no order available. Please review and place either future orders or HMPO (Review of Health Maintenance Protocol Orders), as appropriate.    Health Maintenance Due   Topic    ANNUAL REVIEW OF HM ORDERS     HIV SCREENING     HEPATITIS C SCREENING     LIPID      Elke Owen LCT

## 2023-11-17 ENCOUNTER — LAB (OUTPATIENT)
Dept: LAB | Facility: CLINIC | Age: 43
End: 2023-11-17
Payer: COMMERCIAL

## 2023-11-17 DIAGNOSIS — I10 ESSENTIAL HYPERTENSION WITH GOAL BLOOD PRESSURE LESS THAN 140/90: ICD-10-CM

## 2023-11-17 DIAGNOSIS — Z12.5 SCREENING PSA (PROSTATE SPECIFIC ANTIGEN): ICD-10-CM

## 2023-11-17 LAB
ALBUMIN SERPL BCG-MCNC: 4.6 G/DL (ref 3.5–5.2)
ANION GAP SERPL CALCULATED.3IONS-SCNC: 12 MMOL/L (ref 7–15)
BUN SERPL-MCNC: 13.3 MG/DL (ref 6–20)
CALCIUM SERPL-MCNC: 10.3 MG/DL (ref 8.6–10)
CHLORIDE SERPL-SCNC: 97 MMOL/L (ref 98–107)
CREAT SERPL-MCNC: 0.82 MG/DL (ref 0.67–1.17)
DEPRECATED HCO3 PLAS-SCNC: 29 MMOL/L (ref 22–29)
EGFRCR SERPLBLD CKD-EPI 2021: >90 ML/MIN/1.73M2
GLUCOSE SERPL-MCNC: 100 MG/DL (ref 70–99)
PHOSPHATE SERPL-MCNC: 2.8 MG/DL (ref 2.5–4.5)
POTASSIUM SERPL-SCNC: 3.7 MMOL/L (ref 3.4–5.3)
PSA SERPL DL<=0.01 NG/ML-MCNC: 0.53 NG/ML (ref 0–2.5)
SODIUM SERPL-SCNC: 138 MMOL/L (ref 135–145)

## 2023-11-17 PROCEDURE — G0103 PSA SCREENING: HCPCS

## 2023-11-17 PROCEDURE — 36415 COLL VENOUS BLD VENIPUNCTURE: CPT

## 2023-11-17 PROCEDURE — 80069 RENAL FUNCTION PANEL: CPT

## 2023-11-24 ENCOUNTER — OFFICE VISIT (OUTPATIENT)
Dept: FAMILY MEDICINE | Facility: CLINIC | Age: 43
End: 2023-11-24
Payer: COMMERCIAL

## 2023-11-24 VITALS
WEIGHT: 264 LBS | TEMPERATURE: 97 F | HEIGHT: 73 IN | RESPIRATION RATE: 14 BRPM | OXYGEN SATURATION: 99 % | SYSTOLIC BLOOD PRESSURE: 142 MMHG | DIASTOLIC BLOOD PRESSURE: 92 MMHG | HEART RATE: 74 BPM | BODY MASS INDEX: 34.99 KG/M2

## 2023-11-24 DIAGNOSIS — Z00.00 ROUTINE GENERAL MEDICAL EXAMINATION AT A HEALTH CARE FACILITY: Primary | ICD-10-CM

## 2023-11-24 DIAGNOSIS — I10 HYPERTENSION GOAL BP (BLOOD PRESSURE) < 140/90: ICD-10-CM

## 2023-11-24 PROCEDURE — 99396 PREV VISIT EST AGE 40-64: CPT | Performed by: FAMILY MEDICINE

## 2023-11-24 RX ORDER — LOSARTAN POTASSIUM AND HYDROCHLOROTHIAZIDE 25; 100 MG/1; MG/1
TABLET ORAL
Qty: 90 TABLET | Refills: 3 | Status: SHIPPED | OUTPATIENT
Start: 2023-11-24

## 2023-11-24 RX ORDER — AMLODIPINE BESYLATE 5 MG/1
TABLET ORAL
Qty: 90 TABLET | Refills: 3 | Status: SHIPPED | OUTPATIENT
Start: 2023-11-24

## 2023-11-24 ASSESSMENT — ENCOUNTER SYMPTOMS
ABDOMINAL PAIN: 0
FEVER: 0
WEAKNESS: 0
HEARTBURN: 0
NAUSEA: 0
HEADACHES: 0
CONSTIPATION: 0
SHORTNESS OF BREATH: 0
COUGH: 0
HEMATOCHEZIA: 0
JOINT SWELLING: 0
PALPITATIONS: 0
DIZZINESS: 0
SORE THROAT: 0
DIARRHEA: 0
HEMATURIA: 0
NERVOUS/ANXIOUS: 0
CHILLS: 0
DYSURIA: 0
ARTHRALGIAS: 0
EYE PAIN: 0
FREQUENCY: 0
MYALGIAS: 0
PARESTHESIAS: 0

## 2023-11-24 ASSESSMENT — PAIN SCALES - GENERAL: PAINLEVEL: NO PAIN (1)

## 2023-11-24 NOTE — PROGRESS NOTES
SUBJECTIVE:   Chato is a 43 year old, presenting for the following:  Physical and Recheck Medication  Follow-up htn, high triglycerides and obesity   No family history  colon cancer. No nausea, vomiting or diarrhea or black/bloody stools.   History epididymitis - doing ok. S/p vasectomy. No bike riding. Lots of sitting for day.  No std concerns.   - going ok. 2 teenager daughter 17,13.  Sleep overall ok. No major snoring/marshall noted.   No taking norvasc regularly.   Taking hyzaar. Blood pressure cuff at home. A little high in am. 140-160/.   Family history htn. Grandpa CVA in late 50's. Parents doing ok.   Non-smoker. Caffeine - cut down. ALCOHOL rare.   No chest pain or shortness of breath. Exercise improving.   No urine changes or hematuria.  Water intake good.   Work going ok. Fiber one bars.+      11/24/2023    10:26 AM   Additional Questions   Roomed by Nilsa   Accompanied by N/a       Healthy Habits:     Getting at least 3 servings of Calcium per day:  Yes    Bi-annual eye exam:  NO    Dental care twice a year:  Yes    Sleep apnea or symptoms of sleep apnea:  None    Diet:  Regular (no restrictions)    Frequency of exercise:  2-3 days/week    Duration of exercise:  15-30 minutes    Taking medications regularly:  Yes    Medication side effects:  Not applicable    Additional concerns today:  No      Have you ever done Advance Care Planning? (For example, a Health Directive, POLST, or a discussion with a medical provider or your loved ones about your wishes): No, advance care planning information given to patient to review.  Patient declined advance care planning discussion at this time.    Social History     Tobacco Use    Smoking status: Never     Passive exposure: Never    Smokeless tobacco: Never   Substance Use Topics    Alcohol use: Yes     Comment: rare/ once a month              11/24/2023    10:24 AM   Alcohol Use   Prescreen: >3 drinks/day or >7 drinks/week? No       Last PSA:   Prostate  "Specific Antigen Screen   Date Value Ref Range Status   11/17/2023 0.53 0.00 - 2.50 ng/mL Final       Reviewed orders with patient. Reviewed health maintenance and updated orders accordingly - Yes    Reviewed and updated as needed this visit by clinical staff   Tobacco  Allergies  Meds              Reviewed and updated as needed this visit by Provider                   Review of Systems   Constitutional:  Negative for chills and fever.   HENT:  Negative for congestion, ear pain, hearing loss and sore throat.    Eyes:  Negative for pain and visual disturbance.   Respiratory:  Negative for cough and shortness of breath.    Cardiovascular:  Negative for chest pain, palpitations and peripheral edema.   Gastrointestinal:  Negative for abdominal pain, constipation, diarrhea, heartburn, hematochezia and nausea.   Genitourinary:  Negative for dysuria, frequency, genital sores, hematuria and urgency.   Musculoskeletal:  Negative for arthralgias, joint swelling and myalgias.   Skin:  Negative for rash.   Neurological:  Negative for dizziness, weakness, headaches and paresthesias.   Psychiatric/Behavioral:  Negative for mood changes. The patient is not nervous/anxious.        OBJECTIVE:   BP (!) 142/92   Pulse 74   Temp 97  F (36.1  C) (Tympanic)   Resp 14   Ht 1.854 m (6' 1\")   Wt 119.7 kg (264 lb)   SpO2 99%   BMI 34.83 kg/m     Physical Exam  GENERAL: healthy, alert and no distress  EYES: Eyes grossly normal to inspection, PERRL and conjunctivae and sclerae normal  HENT: ear canals and TM's normal, nose and mouth without ulcers or lesions  NECK: no adenopathy, no asymmetry, masses, or scars and thyroid normal to palpation  RESP: lungs clear to auscultation - no rales, rhonchi or wheezes  BREAST: normal without masses, tenderness or nipple discharge and no palpable axillary masses or adenopathy  CV: regular rate and rhythm, normal S1 S2, no S3 or S4, no murmur, click or rub, no peripheral edema and peripheral " "pulses strong  ABDOMEN: soft, nontender, no hepatosplenomegaly, no masses and bowel sounds normal   (male): patient deferred /rectal exams  MS: no gross musculoskeletal defects noted, no edema  SKIN: no suspicious lesions or rashes  NEURO: Normal strength and tone, mentation intact and speech normal  PSYCH: mentation appears normal, affect normal/bright  LYMPH: no cervical, supraclavicular, axillary adenopathy    ASSESSMENT/PLAN:   ASSESSMENT / PLAN:  (Z00.00) Routine general medical examination at a health care facility  (primary encounter diagnosis)  Comment: generally healthy and normal exam/labs  Plan: Reviewed self mole/testicle check handout.  vitmainD supplement.     (I10) Hypertension goal BP (blood pressure) < 140/90  Comment: needs help  Plan: amLODIPine (NORVASC) 5 MG tablet,         losartan-hydrochlorothiazide (HYZAAR) 100-25 MG        tablet        Restart norvasc. Reveiwed risks and side effects of medication  Chest pain or shortness of breath,. Exercise/lower sodium/ weight loss and continue self-monitor. Return to clinic if worse. Call/email with questions/concerns        Patient has been advised of split billing requirements and indicates understanding: Yes      COUNSELING:   Reviewed preventive health counseling, as reflected in patient instructions       Regular exercise       Healthy diet/nutrition       Vision screening       Alcohol Use        Prostate cancer screening       Osteoporosis prevention/bone health      BMI:   Estimated body mass index is 34.83 kg/m  as calculated from the following:    Height as of this encounter: 1.854 m (6' 1\").    Weight as of this encounter: 119.7 kg (264 lb).   Weight management plan: Discussed healthy diet and exercise guidelines      He reports that he has never smoked. He has never been exposed to tobacco smoke. He has never used smokeless tobacco.            Yon Cuellar MD  [facility]  "

## 2024-09-10 NOTE — TELEPHONE ENCOUNTER
Pt states his pharmacy said the medication he received was not part of the recall.  Sonia Cardenas BSN, RN     What Type Of Note Output Would You Prefer (Optional)?: Standard Output Hpi Title: Evaluation of a Skin Lesion How Severe Are Your Spot(S)?: mild Have Your Spot(S) Been Treated In The Past?: has not been treated

## 2024-11-07 DIAGNOSIS — I10 HYPERTENSION GOAL BP (BLOOD PRESSURE) < 140/90: ICD-10-CM

## 2024-11-08 RX ORDER — LOSARTAN POTASSIUM AND HYDROCHLOROTHIAZIDE 25; 100 MG/1; MG/1
TABLET ORAL
Qty: 90 TABLET | Refills: 0 | Status: SHIPPED | OUTPATIENT
Start: 2024-11-08

## 2024-11-25 SDOH — HEALTH STABILITY: PHYSICAL HEALTH: ON AVERAGE, HOW MANY MINUTES DO YOU ENGAGE IN EXERCISE AT THIS LEVEL?: 30 MIN

## 2024-11-25 SDOH — HEALTH STABILITY: PHYSICAL HEALTH: ON AVERAGE, HOW MANY DAYS PER WEEK DO YOU ENGAGE IN MODERATE TO STRENUOUS EXERCISE (LIKE A BRISK WALK)?: 5 DAYS

## 2024-11-25 ASSESSMENT — SOCIAL DETERMINANTS OF HEALTH (SDOH): HOW OFTEN DO YOU GET TOGETHER WITH FRIENDS OR RELATIVES?: ONCE A WEEK

## 2024-11-26 ENCOUNTER — OFFICE VISIT (OUTPATIENT)
Dept: FAMILY MEDICINE | Facility: CLINIC | Age: 44
End: 2024-11-26
Payer: COMMERCIAL

## 2024-11-26 VITALS
HEIGHT: 73 IN | HEART RATE: 87 BPM | BODY MASS INDEX: 34.72 KG/M2 | WEIGHT: 262 LBS | SYSTOLIC BLOOD PRESSURE: 125 MMHG | RESPIRATION RATE: 16 BRPM | TEMPERATURE: 96.9 F | DIASTOLIC BLOOD PRESSURE: 84 MMHG | OXYGEN SATURATION: 98 %

## 2024-11-26 DIAGNOSIS — I10 ESSENTIAL HYPERTENSION WITH GOAL BLOOD PRESSURE LESS THAN 140/90: ICD-10-CM

## 2024-11-26 DIAGNOSIS — Z00.00 ROUTINE GENERAL MEDICAL EXAMINATION AT A HEALTH CARE FACILITY: Primary | ICD-10-CM

## 2024-11-26 DIAGNOSIS — Z12.5 SCREENING PSA (PROSTATE SPECIFIC ANTIGEN): ICD-10-CM

## 2024-11-26 LAB
ERYTHROCYTE [DISTWIDTH] IN BLOOD BY AUTOMATED COUNT: 11.3 % (ref 10–15)
HCT VFR BLD AUTO: 42.7 % (ref 40–53)
HGB BLD-MCNC: 15.1 G/DL (ref 13.3–17.7)
MCH RBC QN AUTO: 30.2 PG (ref 26.5–33)
MCHC RBC AUTO-ENTMCNC: 35.4 G/DL (ref 31.5–36.5)
MCV RBC AUTO: 85 FL (ref 78–100)
PLATELET # BLD AUTO: 356 10E3/UL (ref 150–450)
RBC # BLD AUTO: 5 10E6/UL (ref 4.4–5.9)
WBC # BLD AUTO: 7.7 10E3/UL (ref 4–11)

## 2024-11-26 PROCEDURE — G0103 PSA SCREENING: HCPCS | Performed by: FAMILY MEDICINE

## 2024-11-26 PROCEDURE — 99396 PREV VISIT EST AGE 40-64: CPT | Performed by: FAMILY MEDICINE

## 2024-11-26 PROCEDURE — 85027 COMPLETE CBC AUTOMATED: CPT | Performed by: FAMILY MEDICINE

## 2024-11-26 PROCEDURE — 36415 COLL VENOUS BLD VENIPUNCTURE: CPT | Performed by: FAMILY MEDICINE

## 2024-11-26 PROCEDURE — 80053 COMPREHEN METABOLIC PANEL: CPT | Performed by: FAMILY MEDICINE

## 2024-11-26 RX ORDER — AMLODIPINE BESYLATE 5 MG/1
TABLET ORAL
Qty: 90 TABLET | Refills: 2 | Status: SHIPPED | OUTPATIENT
Start: 2024-11-26

## 2024-11-26 RX ORDER — LOSARTAN POTASSIUM AND HYDROCHLOROTHIAZIDE 25; 100 MG/1; MG/1
TABLET ORAL
Qty: 90 TABLET | Refills: 2 | Status: SHIPPED | OUTPATIENT
Start: 2024-11-26

## 2024-11-26 NOTE — PATIENT INSTRUCTIONS
Patient Education   Preventive Care Advice   This is general advice given by our system to help you stay healthy. However, your care team may have specific advice just for you. Please talk to your care team about your preventive care needs.  Nutrition  Eat 5 or more servings of fruits and vegetables each day.  Try wheat bread, brown rice and whole grain pasta (instead of white bread, rice, and pasta).  Get enough calcium and vitamin D. Check the label on foods and aim for 100% of the RDA (recommended daily allowance).  Lifestyle  Exercise at least 150 minutes each week  (30 minutes a day, 5 days a week).  Do muscle strengthening activities 2 days a week. These help control your weight and prevent disease.  No smoking.  Wear sunscreen to prevent skin cancer.  Have a dental exam and cleaning every 6 months.  Yearly exams  See your health care team every year to talk about:  Any changes in your health.  Any medicines your care team has prescribed.  Preventive care, family planning, and ways to prevent chronic diseases.  Shots (vaccines)   HPV shots (up to age 26), if you've never had them before.  Hepatitis B shots (up to age 59), if you've never had them before.  COVID-19 shot: Get this shot when it's due.  Flu shot: Get a flu shot every year.  Tetanus shot: Get a tetanus shot every 10 years.  Pneumococcal, hepatitis A, and RSV shots: Ask your care team if you need these based on your risk.  Shingles shot (for age 50 and up)  General health tests  Diabetes screening:  Starting at age 35, Get screened for diabetes at least every 3 years.  If you are younger than age 35, ask your care team if you should be screened for diabetes.  Cholesterol test: At age 39, start having a cholesterol test every 5 years, or more often if advised.  Bone density scan (DEXA): At age 50, ask your care team if you should have this scan for osteoporosis (brittle bones).  Hepatitis C: Get tested at least once in your life.  STIs (sexually  transmitted infections)  Before age 24: Ask your care team if you should be screened for STIs.  After age 24: Get screened for STIs if you're at risk. You are at risk for STIs (including HIV) if:  You are sexually active with more than one person.  You don't use condoms every time.  You or a partner was diagnosed with a sexually transmitted infection.  If you are at risk for HIV, ask about PrEP medicine to prevent HIV.  Get tested for HIV at least once in your life, whether you are at risk for HIV or not.  Cancer screening tests  Cervical cancer screening: If you have a cervix, begin getting regular cervical cancer screening tests starting at age 21.  Breast cancer scan (mammogram): If you've ever had breasts, begin having regular mammograms starting at age 40. This is a scan to check for breast cancer.  Colon cancer screening: It is important to start screening for colon cancer at age 45.  Have a colonoscopy test every 10 years (or more often if you're at risk) Or, ask your provider about stool tests like a FIT test every year or Cologuard test every 3 years.  To learn more about your testing options, visit:   .  For help making a decision, visit:   https://bit.ly/qa77203.  Prostate cancer screening test: If you have a prostate, ask your care team if a prostate cancer screening test (PSA) at age 55 is right for you.  Lung cancer screening: If you are a current or former smoker ages 50 to 80, ask your care team if ongoing lung cancer screenings are right for you.  For informational purposes only. Not to replace the advice of your health care provider. Copyright   2023 [facility]. All rights reserved. Clinically reviewed by the [facility]. [employer] 955899 - REV 01/24.  Learning About Stress  What is stress?     Stress is your body's response to a hard situation. Your body can have a physical, emotional, or mental response. Stress is a fact of life for most people, and it  affects everyone differently. What causes stress for you may not be stressful for someone else.  A lot of things can cause stress. You may feel stress when you go on a job interview, take a test, or run a race. This kind of short-term stress is normal and even useful. It can help you if you need to work hard or react quickly. For example, stress can help you finish an important job on time.  Long-term stress is caused by ongoing stressful situations or events. Examples of long-term stress include long-term health problems, ongoing problems at work, or conflicts in your family. Long-term stress can harm your health.  How does stress affect your health?  When you are stressed, your body responds as though you are in danger. It makes hormones that speed up your heart, make you breathe faster, and give you a burst of energy. This is called the fight-or-flight stress response. If the stress is over quickly, your body goes back to normal and no harm is done.  But if stress happens too often or lasts too long, it can have bad effects. Long-term stress can make you more likely to get sick, and it can make symptoms of some diseases worse. If you tense up when you are stressed, you may develop neck, shoulder, or low back pain. Stress is linked to high blood pressure and heart disease.  Stress also harms your emotional health. It can make you wilkins, tense, or depressed. Your relationships may suffer, and you may not do well at work or school.  What can you do to manage stress?  You can try these things to help manage stress:   Do something active. Exercise or activity can help reduce stress. Walking is a great way to get started. Even everyday activities such as housecleaning or yard work can help.  Try yoga or lion chi. These techniques combine exercise and meditation. You may need some training at first to learn them.  Do something you enjoy. For example, listen to music or go to a movie. Practice your hobby or do volunteer  "work.  Meditate. This can help you relax, because you are not worrying about what happened before or what may happen in the future.  Do guided imagery. Imagine yourself in any setting that helps you feel calm. You can use online videos, books, or a teacher to guide you.  Do breathing exercises. For example:  From a standing position, bend forward from the waist with your knees slightly bent. Let your arms dangle close to the floor.  Breathe in slowly and deeply as you return to a standing position. Roll up slowly and lift your head last.  Hold your breath for just a few seconds in the standing position.  Breathe out slowly and bend forward from the waist.  Let your feelings out. Talk, laugh, cry, and express anger when you need to. Talking with supportive friends or family, a counselor, or a nancy leader about your feelings is a healthy way to relieve stress. Avoid discussing your feelings with people who make you feel worse.  Write. It may help to write about things that are bothering you. This helps you find out how much stress you feel and what is causing it. When you know this, you can find better ways to cope.  What can you do to prevent stress?  You might try some of these things to help prevent stress:  Manage your time. This helps you find time to do the things you want and need to do.  Get enough sleep. Your body recovers from the stresses of the day while you are sleeping.  Get support. Your family, friends, and community can make a difference in how you experience stress.  Limit your news feed. Avoid or limit time on social media or news that may make you feel stressed.  Do something active. Exercise or activity can help reduce stress. Walking is a great way to get started.  Where can you learn more?  Go to https://www.[employer].net/patiented  Enter N032 in the search box to learn more about \"Learning About Stress.\"  Current as of: October 24, 2023  Content Version: 14.2 2024 [employer]. "   Care instructions adapted under license by your healthcare professional. If you have questions about a medical condition or this instruction, always ask your healthcare professional. [employer] disclaims any warranty or liability for your use of this information.

## 2024-11-26 NOTE — PROGRESS NOTES
Preventive Care Visit  [facility]  Yon Cuellar MD, Family Medicine  Nov 26, 2024      ASSESSMENT / PLAN:  (Z00.00) Routine general medical examination at a health care facility  (primary encounter diagnosis)  Comment: genearlly healthy and normal exam  Plan: Comprehensive metabolic panel, CBC with         platelets        Continue lower carb diet/weight loss/exercise. Reviewed self mole/testicle check handout.  VitaminD. Colonoscopy next fall. Non-fasting - recheck next year    (I10) Essential hypertension with goal blood pressure less than 140/90  Comment: stable  Plan: exercise and diet. Chest pain or shortness of breath to er. Continue meds/self-monitor.     (Z12.5) Screening PSA (prostate specific antigen)  Plan: Prostate Specific Antigen Screen              Tobi Reis is a 44 year old, presenting for the following:  Physical    Follow-up htn, high triglycerides and obesity   Not fasting.   No family history  colon cancer. No nausea, vomiting or diarrhea or black/bloody stools.   History epididymitis - doing ok. S/p vasectomy. Lots of sitting for day.  No std concerns.   - going ok. 2 teenager daughter 18,14. Senor and freshman  Sleep overall ok. No major snoring/marshall noted.   No taking norvasc regularly.   Taking hyzaar. Blood pressure cuff at home. Blood pressure ok.   Family history htn. Grandpa CVA in late 50's. Parents doing ok.   Non-smoker. Caffeine - cut down. ALCOHOL rare.   No chest pain or shortness of breath. Exercise improving.   No urine changes or hematuria. No nausea, vomiting or diarrhea or black/bloody stools.k  Water intake good.   Work going ok  vitaminD. No mole changes.          11/26/2024    11:01 AM   Additional Questions   Roomed by Nilsa   Accompanied by self         11/26/2024    11:01 AM   Patient Reported Additional Medications   Patient reports taking the following new medications n/a          HPI      Health Care Directive  Patient does  not have a Health Care Directive: Discussed advance care planning with patient; however, patient declined at this time.      11/25/2024   General Health   How would you rate your overall physical health? Good   Feel stress (tense, anxious, or unable to sleep) To some extent      (!) STRESS CONCERN      11/25/2024   Nutrition   Three or more servings of calcium each day? Yes   Diet: Regular (no restrictions)   How many servings of fruit and vegetables per day? (!) 2-3   How many sweetened beverages each day? 0-1            11/25/2024   Exercise   Days per week of moderate/strenous exercise 5 days   Average minutes spent exercising at this level 30 min            11/25/2024   Social Factors   Frequency of gathering with friends or relatives Once a week   Worry food won't last until get money to buy more No   Food not last or not have enough money for food? No   Do you have housing? (Housing is defined as stable permanent housing and does not include staying ouside in a car, in a tent, in an abandoned building, in an overnight shelter, or couch-surfing.) Yes   Are you worried about losing your housing? No   Lack of transportation? No   Unable to get utilities (heat,electricity)? No            11/25/2024   Dental   Dentist two times every year? Yes            11/25/2024   TB Screening   Were you born outside of the [place]? No            Today's PHQ-2 Score:       11/25/2024    12:46 PM   PHQ-2 ( 1999 [employer])   Q1: Little interest or pleasure in doing things 0    Q2: Feeling down, depressed or hopeless 0    PHQ-2 Score 0    Q1: Little interest or pleasure in doing things Not at all   Q2: Feeling down, depressed or hopeless Not at all   PHQ-2 Score 0       Patient-reported           11/25/2024   Substance Use   Alcohol more than 3/day or more than 7/wk No   Do you use any other substances recreationally? No        Social History     Tobacco Use    Smoking status: Never     Passive exposure: Never    Smokeless tobacco: Never  "  Vaping Use    Vaping status: Never Used   Substance Use Topics    Alcohol use: Yes     Comment: Rare    Drug use: No             11/25/2024   One time HIV Screening   Previous HIV test? No          11/25/2024   STI Screening   New sexual partner(s) since last STI/HIV test? No      ASCVD Risk   The 10-year ASCVD risk score (Long PERAZA, et al., 2019) is: 2.9%    Values used to calculate the score:      Age: 44 years      Sex: Male      Is Non- : No      Diabetic: No      Tobacco smoker: No      Systolic Blood Pressure: 125 mmHg      Is BP treated: Yes      HDL Cholesterol: 39 mg/dL      Total Cholesterol: 203 mg/dL       Reviewed and updated as needed this visit by Provider                             Objective    Exam  /84   Pulse 87   Temp 96.9  F (36.1  C) (Tympanic)   Resp 16   Ht 1.842 m (6' 0.5\")   Wt 118.8 kg (262 lb)   SpO2 98%   BMI 35.05 kg/m     Estimated body mass index is 35.05 kg/m  as calculated from the following:    Height as of this encounter: 1.842 m (6' 0.5\").    Weight as of this encounter: 118.8 kg (262 lb).    Physical Exam  GENERAL: alert and no distress  EYES: Eyes grossly normal to inspection, PERRL and conjunctivae and sclerae normal  HENT: ear canals and TM's normal, nose and mouth without ulcers or lesions  NECK: no adenopathy, no asymmetry, masses, or scars  RESP: lungs clear to auscultation - no rales, rhonchi or wheezes  BREAST: normal without masses, tenderness or nipple discharge and no palpable axillary masses or adenopathy  CV: regular rate and rhythm, normal S1 S2, no S3 or S4, no murmur, click or rub, no peripheral edema   ABDOMEN: soft, nontender, no hepatosplenomegaly, no masses and bowel sounds normal   (male): patient deferred /rectal exams  MS: no gross musculoskeletal defects noted, no edema  SKIN: no suspicious lesions or rashes  NEURO: Normal strength and tone, mentation intact and speech normal  PSYCH: mentation appears " normal, affect normal/bright  LYMPH: no cervical, supraclavicular, axillary adenopathy        Signed Electronically by: Yno Cuellar MD

## 2024-11-27 LAB
ALBUMIN SERPL BCG-MCNC: 4.7 G/DL (ref 3.5–5.2)
ALP SERPL-CCNC: 60 U/L (ref 40–150)
ALT SERPL W P-5'-P-CCNC: 39 U/L (ref 0–70)
ANION GAP SERPL CALCULATED.3IONS-SCNC: 12 MMOL/L (ref 7–15)
AST SERPL W P-5'-P-CCNC: 23 U/L (ref 0–45)
BILIRUB SERPL-MCNC: 0.4 MG/DL
BUN SERPL-MCNC: 11.1 MG/DL (ref 6–20)
CALCIUM SERPL-MCNC: 9.8 MG/DL (ref 8.8–10.4)
CHLORIDE SERPL-SCNC: 99 MMOL/L (ref 98–107)
CREAT SERPL-MCNC: 0.81 MG/DL (ref 0.67–1.17)
EGFRCR SERPLBLD CKD-EPI 2021: >90 ML/MIN/1.73M2
GLUCOSE SERPL-MCNC: 100 MG/DL (ref 70–99)
HCO3 SERPL-SCNC: 28 MMOL/L (ref 22–29)
POTASSIUM SERPL-SCNC: 3.9 MMOL/L (ref 3.4–5.3)
PROT SERPL-MCNC: 8.1 G/DL (ref 6.4–8.3)
PSA SERPL DL<=0.01 NG/ML-MCNC: 0.56 NG/ML (ref 0–2.5)
SODIUM SERPL-SCNC: 139 MMOL/L (ref 135–145)

## 2025-07-20 DIAGNOSIS — I10 ESSENTIAL HYPERTENSION WITH GOAL BLOOD PRESSURE LESS THAN 140/90: Primary | ICD-10-CM

## 2025-07-20 NOTE — LETTER
July 21, 2025    Chato Kirkland  [address] [address] [id number]    Dear Chato,     We recently received a refill request for losartan-hydrochlorothiazide (HYZAAR) 100-25 MG tablets.  We have refilled this for a one time 90 day supply only because you are due for a:    Medication Recheck plus previsit, fasting labs in December 2025.    Please call at your earliest convenience so that there will not be a delay with your future refills.    Thank you,         Your [facility] Team/[facility]  [phone]          Electronically signed

## 2025-07-21 RX ORDER — LOSARTAN POTASSIUM AND HYDROCHLOROTHIAZIDE 25; 100 MG/1; MG/1
TABLET ORAL
Qty: 90 TABLET | Refills: 1 | Status: SHIPPED | OUTPATIENT
Start: 2025-07-21

## 2025-07-21 NOTE — TELEPHONE ENCOUNTER
Reminder letter mailed to patient with provider's message written verbatim.  Jessica KELLY    [facility]